# Patient Record
Sex: MALE | Race: WHITE | NOT HISPANIC OR LATINO | Employment: OTHER | URBAN - METROPOLITAN AREA
[De-identification: names, ages, dates, MRNs, and addresses within clinical notes are randomized per-mention and may not be internally consistent; named-entity substitution may affect disease eponyms.]

---

## 2023-02-23 LAB
ALANINE AMINOTRANSFERASE (SGPT) (U/L) IN SER/PLAS: 24 U/L (ref 10–52)
ALBUMIN (G/DL) IN SER/PLAS: 4.7 G/DL (ref 3.4–5)
ALKALINE PHOSPHATASE (U/L) IN SER/PLAS: 73 U/L (ref 33–136)
ANION GAP IN SER/PLAS: 12 MMOL/L (ref 10–20)
ASPARTATE AMINOTRANSFERASE (SGOT) (U/L) IN SER/PLAS: 24 U/L (ref 9–39)
BILIRUBIN TOTAL (MG/DL) IN SER/PLAS: 0.9 MG/DL (ref 0–1.2)
CALCIUM (MG/DL) IN SER/PLAS: 10.3 MG/DL (ref 8.6–10.3)
CARBON DIOXIDE, TOTAL (MMOL/L) IN SER/PLAS: 29 MMOL/L (ref 21–32)
CHLORIDE (MMOL/L) IN SER/PLAS: 103 MMOL/L (ref 98–107)
CHOLESTEROL (MG/DL) IN SER/PLAS: 103 MG/DL (ref 0–199)
CHOLESTEROL IN HDL (MG/DL) IN SER/PLAS: 34.3 MG/DL
CHOLESTEROL/HDL RATIO: 3
CREATININE (MG/DL) IN SER/PLAS: 1.38 MG/DL (ref 0.5–1.3)
ERYTHROCYTE DISTRIBUTION WIDTH (RATIO) BY AUTOMATED COUNT: 13.4 % (ref 11.5–14.5)
ERYTHROCYTE MEAN CORPUSCULAR HEMOGLOBIN CONCENTRATION (G/DL) BY AUTOMATED: 32.3 G/DL (ref 32–36)
ERYTHROCYTE MEAN CORPUSCULAR VOLUME (FL) BY AUTOMATED COUNT: 98 FL (ref 80–100)
ERYTHROCYTES (10*6/UL) IN BLOOD BY AUTOMATED COUNT: 4.6 X10E12/L (ref 4.5–5.9)
ESTIMATED AVERAGE GLUCOSE FOR HBA1C: 120 MG/DL
GFR MALE: 53 ML/MIN/1.73M2
GLUCOSE (MG/DL) IN SER/PLAS: 126 MG/DL (ref 74–99)
HEMATOCRIT (%) IN BLOOD BY AUTOMATED COUNT: 45.2 % (ref 41–52)
HEMOGLOBIN (G/DL) IN BLOOD: 14.6 G/DL (ref 13.5–17.5)
HEMOGLOBIN A1C/HEMOGLOBIN TOTAL IN BLOOD: 5.8 %
LDL: 31 MG/DL (ref 0–99)
LEUKOCYTES (10*3/UL) IN BLOOD BY AUTOMATED COUNT: 7.6 X10E9/L (ref 4.4–11.3)
MAGNESIUM (MG/DL) IN SER/PLAS: 1.69 MG/DL (ref 1.6–2.4)
NRBC (PER 100 WBCS) BY AUTOMATED COUNT: 0 /100 WBC (ref 0–0)
PLATELETS (10*3/UL) IN BLOOD AUTOMATED COUNT: 240 X10E9/L (ref 150–450)
POTASSIUM (MMOL/L) IN SER/PLAS: 4.4 MMOL/L (ref 3.5–5.3)
PROTEIN TOTAL: 7 G/DL (ref 6.4–8.2)
SODIUM (MMOL/L) IN SER/PLAS: 140 MMOL/L (ref 136–145)
THYROTROPIN (MIU/L) IN SER/PLAS BY DETECTION LIMIT <= 0.05 MIU/L: 3.15 MIU/L (ref 0.44–3.98)
TRIGLYCERIDE (MG/DL) IN SER/PLAS: 191 MG/DL (ref 0–149)
UREA NITROGEN (MG/DL) IN SER/PLAS: 24 MG/DL (ref 6–23)
VLDL: 38 MG/DL (ref 0–40)

## 2023-04-05 DIAGNOSIS — E11.69 TYPE 2 DIABETES MELLITUS WITH OTHER SPECIFIED COMPLICATION, UNSPECIFIED WHETHER LONG TERM INSULIN USE (MULTI): Primary | ICD-10-CM

## 2023-04-05 DIAGNOSIS — I10 PRIMARY HYPERTENSION: ICD-10-CM

## 2023-04-06 RX ORDER — METFORMIN HYDROCHLORIDE 1000 MG/1
TABLET ORAL
Qty: 180 TABLET | Refills: 3 | Status: SHIPPED | OUTPATIENT
Start: 2023-04-06 | End: 2024-05-10 | Stop reason: SDUPTHER

## 2023-04-06 RX ORDER — AMLODIPINE BESYLATE 5 MG/1
TABLET ORAL
Qty: 180 TABLET | Refills: 3 | Status: SHIPPED | OUTPATIENT
Start: 2023-04-06 | End: 2024-05-10 | Stop reason: SDUPTHER

## 2023-05-25 DIAGNOSIS — E11.69 TYPE 2 DIABETES MELLITUS WITH OTHER SPECIFIED COMPLICATION, WITHOUT LONG-TERM CURRENT USE OF INSULIN (MULTI): Primary | ICD-10-CM

## 2023-05-25 PROBLEM — H61.20 IMPACTED CERUMEN: Status: ACTIVE | Noted: 2023-05-25

## 2023-05-25 PROBLEM — R00.0 TACHYCARDIA: Status: ACTIVE | Noted: 2023-01-23

## 2023-05-25 PROBLEM — K85.90 ACUTE PANCREATITIS (HHS-HCC): Status: ACTIVE | Noted: 2023-01-27

## 2023-05-25 PROBLEM — J30.2 SEASONAL ALLERGIES: Status: ACTIVE | Noted: 2023-05-25

## 2023-05-25 PROBLEM — E66.01 SEVERE OBESITY (BMI 35.0-39.9) WITH COMORBIDITY (MULTI): Status: ACTIVE | Noted: 2023-01-31

## 2023-05-25 PROBLEM — K25.7 CHRONIC GASTRIC ULCER WITHOUT HEMORRHAGE AND WITHOUT PERFORATION: Status: ACTIVE | Noted: 2023-01-31

## 2023-05-25 PROBLEM — E55.9 VITAMIN D DEFICIENCY: Status: ACTIVE | Noted: 2023-05-25

## 2023-05-25 PROBLEM — E03.9 HYPOTHYROIDISM: Status: ACTIVE | Noted: 2023-01-31

## 2023-05-25 PROBLEM — U07.1 DISEASE DUE TO SEVERE ACUTE RESPIRATORY SYNDROME CORONAVIRUS 2 (SARS-COV-2): Status: ACTIVE | Noted: 2023-05-25

## 2023-05-25 PROBLEM — N18.32 STAGE 3B CHRONIC KIDNEY DISEASE (MULTI): Status: ACTIVE | Noted: 2023-01-31

## 2023-05-25 PROBLEM — I38 ENDOCARDITIS: Status: ACTIVE | Noted: 2023-05-25

## 2023-05-25 PROBLEM — I10 BENIGN ESSENTIAL HYPERTENSION: Status: ACTIVE | Noted: 2023-05-25

## 2023-05-25 PROBLEM — E87.70 LOCALIZED EDEMA DUE TO FLUID OVERLOAD: Status: ACTIVE | Noted: 2023-05-25

## 2023-05-25 PROBLEM — K81.9 CHOLECYSTITIS: Status: ACTIVE | Noted: 2023-01-27

## 2023-05-25 PROBLEM — I44.1 MOBITZ TYPE I INCOMPLETE ATRIOVENTRICULAR BLOCK: Status: ACTIVE | Noted: 2023-05-25

## 2023-05-25 PROBLEM — E78.5 HYPERLIPIDEMIA: Status: ACTIVE | Noted: 2023-01-31

## 2023-05-25 PROBLEM — I48.92 ATRIAL FLUTTER (MULTI): Status: ACTIVE | Noted: 2023-05-25

## 2023-05-25 PROBLEM — E11.9 TYPE 2 DIABETES MELLITUS (MULTI): Status: ACTIVE | Noted: 2023-01-31

## 2023-05-25 PROBLEM — K22.70 BARRETT'S ESOPHAGUS: Status: ACTIVE | Noted: 2023-05-25

## 2023-05-25 PROBLEM — E83.52 HYPERCALCEMIA: Status: ACTIVE | Noted: 2023-05-25

## 2023-05-25 PROBLEM — N28.9 DISORDER OF KIDNEY: Status: ACTIVE | Noted: 2023-05-25

## 2023-05-25 RX ORDER — OMEGA-3-ACID ETHYL ESTERS 1 G/1
1 CAPSULE, LIQUID FILLED ORAL 2 TIMES DAILY
COMMUNITY
Start: 2014-09-02

## 2023-05-25 RX ORDER — ACETAMINOPHEN 500 MG/1
500 CAPSULE, LIQUID FILLED ORAL EVERY 6 HOURS PRN
COMMUNITY
End: 2023-12-04 | Stop reason: SINTOL

## 2023-05-25 RX ORDER — LORATADINE 10 MG/1
10 TABLET ORAL DAILY
COMMUNITY
Start: 2015-04-29

## 2023-05-25 RX ORDER — GLIPIZIDE 5 MG/1
5 TABLET ORAL
COMMUNITY
Start: 2016-04-28 | End: 2023-10-14

## 2023-05-25 RX ORDER — PANTOPRAZOLE SODIUM 40 MG/1
40 TABLET, DELAYED RELEASE ORAL
COMMUNITY
End: 2024-05-09 | Stop reason: SDUPTHER

## 2023-05-25 RX ORDER — SOTALOL HYDROCHLORIDE 120 MG/1
120 TABLET ORAL 2 TIMES DAILY
COMMUNITY
Start: 2023-01-05

## 2023-05-25 RX ORDER — ASPIRIN 81 MG/1
81 TABLET ORAL
COMMUNITY

## 2023-05-25 RX ORDER — SUCRALFATE 1 G/1
1 TABLET ORAL EVERY 6 HOURS
COMMUNITY
Start: 2023-02-04 | End: 2023-06-06 | Stop reason: ALTCHOICE

## 2023-05-25 RX ORDER — GLUCOSAMINE/CHONDR SU A SOD 750-600 MG
1 TABLET ORAL 2 TIMES DAILY
COMMUNITY

## 2023-05-25 RX ORDER — MULTIVITAMIN
1 TABLET ORAL
COMMUNITY

## 2023-05-25 RX ORDER — ATORVASTATIN CALCIUM 10 MG/1
10 TABLET, FILM COATED ORAL
COMMUNITY
Start: 2016-04-28 | End: 2023-07-02 | Stop reason: SDUPTHER

## 2023-05-25 RX ORDER — ACETAMINOPHEN 500 MG
50 TABLET ORAL DAILY
COMMUNITY
Start: 2018-06-14

## 2023-05-25 RX ORDER — LEVOTHYROXINE SODIUM 50 UG/1
50 TABLET ORAL
COMMUNITY
Start: 2012-09-16 | End: 2023-10-14

## 2023-05-25 RX ORDER — LISINOPRIL 40 MG/1
40 TABLET ORAL 2 TIMES DAILY
COMMUNITY
Start: 2012-10-01 | End: 2023-10-14

## 2023-05-25 RX ORDER — HYDROCHLOROTHIAZIDE 25 MG/1
25 TABLET ORAL
COMMUNITY
End: 2024-01-15 | Stop reason: SDUPTHER

## 2023-05-25 RX ORDER — LANCETS
1 EACH MISCELLANEOUS
COMMUNITY
Start: 2023-04-05 | End: 2023-10-14

## 2023-05-26 DIAGNOSIS — E11.9 CONTROLLED TYPE 2 DIABETES MELLITUS WITHOUT COMPLICATION, WITHOUT LONG-TERM CURRENT USE OF INSULIN (MULTI): Primary | ICD-10-CM

## 2023-05-26 PROBLEM — R74.01 ELEVATION OF LEVELS OF LIVER TRANSAMINASE LEVELS: Status: ACTIVE | Noted: 2023-01-27

## 2023-05-26 PROBLEM — K29.70 GASTRITIS: Status: ACTIVE | Noted: 2023-01-27

## 2023-05-26 RX ORDER — BLOOD SUGAR DIAGNOSTIC
1 STRIP MISCELLANEOUS 2 TIMES DAILY
Qty: 180 STRIP | Refills: 1 | Status: SHIPPED | OUTPATIENT
Start: 2023-05-26 | End: 2023-06-06 | Stop reason: ALTCHOICE

## 2023-05-26 RX ORDER — BLOOD SUGAR DIAGNOSTIC
1 STRIP MISCELLANEOUS 2 TIMES DAILY
COMMUNITY
End: 2023-05-26 | Stop reason: SDUPTHER

## 2023-06-05 NOTE — PROGRESS NOTES
Subjective   Reason for Visit: Chito Baxter is an 77 y.o. male here for his Subsequent Medicare Assessment and annual physical        He has episodes where he will remain in a- fib for 2- 3 days  He does not feel lightheaded when standing     He is not exercising routinely     He has hyperkeratosis on the auricles of his ears and seeing dermatology  He uses creams and sometimes derm freezes the area   He used 5 FU cream in 2021.     HEALTH :  PSA 4/15, 4/19, 6/2020 and last one   Colonoscopy 3/12, 10/17 + adenoma, 10/2020 and Q 5  EKG 2012, 2013, 10/14 , 2015 , 12/16, 2018, 2022, 1/2023, 3/2023 cardio  Spot protein 10/13, 10/14, 10/15 , 10/16 , 12/17 , 12/18, 6/19, 6/2020, 6/2021, 6/2022, ordered 6/2023  Hep C 1/18 -  HD flu 10/17, 12/18, 9/19, 9/2020, 9/2021, 9/2022   TDAP 2006, 12/17/18, 9/28/2022  Pneumovax 2011   Prevnar 13 12/14/17  TDAP 2006 and will repeat with injury   Shingrix 5/18 and 8/18  Moderna CVD 2/5/2021 and 3/5/2021 booster 11/16/2021, 3/31/2022, 9/28/2022, 6/3/2023   Ophth He is seeing Dr Turner/ Dr Lubin yearly. No glaucoma or retinopathy. The cataracts are stable OU.  He follows with ophthalmology in December    Copy of his Advanced Directives scanned in his chart 6/2022       Patient Care Team:  Chichi Perez MD as PCP - General  Chichi Perez MD as PCP - [employer] PCP     Review of Systems  All systems negative except those listed in the HPI      Past Medical, Surgical, and Family History reviewed and updated in chart.  Reviewed all medications by prescribing practitioner or clinical pharmacist   (such as prescriptions, OTCs, herbal therapies and supplements) and documented in the medical record      Objective   Vitals:  Visit Vitals  BP 98/60 (BP Location: Left arm, Patient Position: Sitting, BP Cuff Size: Large adult)   Pulse 53   Temp 36 °C (96.8 °F) (Skin)    Body mass index is 33.91 kg/m².      Physical Exam  Vitals reviewed.   Constitutional:       Appearance:  Normal appearance. He is obese.   HENT:      Head: Normocephalic.      Right Ear: Tympanic membrane, ear canal and external ear normal.      Left Ear: Tympanic membrane, ear canal and external ear normal.      Nose: Nose normal.      Mouth/Throat:      Pharynx: Oropharynx is clear.   Eyes:      Conjunctiva/sclera: Conjunctivae normal.   Cardiovascular:      Rate and Rhythm: Normal rate and regular rhythm.      Pulses: Normal pulses.      Heart sounds: Normal heart sounds.      Comments: he has varicose veins and chronic scarring from venous insufficiency LE bilaterally, 2+ pitting edema left ankle   He is in sinus on exam   Pulmonary:      Effort: Pulmonary effort is normal.      Breath sounds: Normal breath sounds.   Abdominal:      General: Bowel sounds are normal.      Palpations: Abdomen is soft.   Musculoskeletal:         General: Normal range of motion.      Cervical back: Normal range of motion and neck supple.   Skin:     General: Skin is warm.   Neurological:      General: No focal deficit present.      Mental Status: He is alert and oriented to person, place, and time.      Comments: Diabetic foot exam is normal bilaterally    Psychiatric:         Mood and Affect: Mood normal.         Behavior: Behavior normal.         Thought Content: Thought content normal.         Judgment: Judgment normal.       Assessment/Plan   Problem List Items Addressed This Visit       Controlled type 2 diabetes mellitus without complication, without long-term current use of insulin (CMS/[facility])    Relevant Orders    CBC    Comprehensive Metabolic Panel    Lipid Panel    TSH with reflex to Free T4 if abnormal    Hemoglobin A1C    Albumin , Urine Random    Stage 3b chronic kidney disease    Overview     Comment on above: Chronic renal failure, stage 3b;         Relevant Orders    Uric Acid    PTH, intact     Other Visit Diagnoses       [facility] maintenance    -  Primary          Subsequent Medicare Assessment and annual physical  "completed   Reviewed his labs from 2/2023   Labs ordered     Medicare Wellness completed  -  Discussed healthy diet and regular exercise.    -  Physical exam overall unremarkable. Immunizations reviewed and updated accordingly. Healthy lifestyle choices discussed (tobacco avoidance, appropriate alcohol use, avoidance of illicit substances).   -  Patient is wearing seatbelt.   -  Screening lab work ordered as indicated.    -  Age appropriate screening tests reviewed with patient.        We spent 15 minutes discussing depression screen and there is nothing found that is of concern for underling depression. The PQH form was filled and the meds reviewed. No depression to report     We spent 15 minutes discussing alcohol use and there are no concerns about overuse. The 15 min was spent in going over any issues of use of alcohol.   He has 1 EtOH drink a day.     He has grab bars in the shower.  He has not fallen recently and no risk of falls in the house   He has good lighting around the house and functioning smoke detectors.     He has no issues with his hearing to report   He had his hearing tested in the past and found to have high frequency loss     His weight in office today is 223 with BMI of 33.91. We spent 15 minutes discussing diet and weight loss. The struggle of weight loss persists    He has been shopping in the \"[employer] section since childhood   He is not exercising routinely     HTN: Stable but low.   Continue lisinopril 40 mg BID, HCTZ 25 mg daily and Norvasc 5 mg BID.   Stress test in 1/11 was normal  PVD studies in 2/12 were normal.  ECG: Afib @ 67 nonspecific ST-T wave abnormalities with Dr Mckeon in 3/2023   Recommend he monitor his BP at home and call with elevated readings   He saw Dr Mckeon in 3/2023     A-Fib: He is in a- fib 2/2023. On exam: he is in sinus 6/2023. He is asymptomatic. Per cardiology he is in a- fib 60% of the time 3/2023. He has episodes where he will remain in a- fib for 2- 3 days "   Dx him with atrial fib on appt in 12/16, he sees Dr Mckeon in cardiology.   He is monitoring his EKG on the [employer].   ECHO was normal, cardioversion failed x 3.   He was admitted on 6/17/17 and did convert with the Sotalol   Continue Sotalol 120 mg BID and Eliquis 5 mg daily.  He needs to see about having an ablation but after talking with Dr Jane, his weight is too high for surgery. He was told he needs to lose 40 pounds.   Continue monitoring BP at home and call with elevated readings   He saw Dr Mckeon in 3/2023     The patient's 10 yr CV risk was estimated at % --- Unable to calculate due to LDL being so low 6/2022     HLD: LDL 31 and HDL 34 on labs in 2/2023. Labs ordered and we will adjust if indicated  6/2023. I would like to see his HDL between 55- 60   Continue atorvastatin 10 mg daily and Fish oil daily.   Ca cardiac score 5/2021 was 339 in LAD and atelectasis noted only   Discussed diet and exercise for better lipid control     Hypothyroid: TSH 3.15 on labs in 2/2023. Labs ordered and we will adjust if indicated  6/2023   Continue levothyroxine 50 mcg daily.    DM: HbA1c was 5.8 % on labs in 2/2023. Labs ordered and we will adjust if indicated  6/2023. Diabetic Composite Score 5/5 IO today 6/2023. Diabetic foot exam normal 6/2023   Continue metformin 1000 mg BID and glipizide 5 mg BID.   He exercises daily and wears support stockings when he travels.   Spot protein was normal in 6/2022 and ordered 6/2023   Recommend he monitor his feet nightly for skin breakdown and ulcerations   He is UTD on his visits with ophthalmology, he sees oph in December every year     Renal insuff: His GFR ranges from low 40's to 55. GFR 53 on labs in 2/2023  Explained I want this to remain under 2.0   We will continue to monitor    Guerrero's esophagus:  ERCP 1/30/23 which showed Esophageal mucosal changes 6 suggestive of Guerrero's esophagus were noted and biopsied. Nonbleeding gastric ulcer with no stigmata of  bleeding was biopsied, duodenal erosions without bleeding were noted. 1 partially occluded stent noted in the duodenum was removed. Choledocholithiasis was noted and successfully removed via biliary sphincterotomy and balloon extraction.  Continue Protonix 40 mg daily   He saw Dr Roberts 3/2023     Seasonal allergies:  Continue loratadine 10 mg prn use     Venous insufficiency: he has varicose veins and chronic scarring from venous insufficiency LE bilaterally, 2+ pitting edema left ankle 6/2023  Continue wearing support stockings with travel.  He will be careful when he first gets up due to venous pooling. Norvasc can contribute to this.     Admitted on 1/23/2023 and discharged on 1/27/2023 for cholecystitis, s/p cholecystectomy   CXR 1/2023 showed mild cardiomegaly, mild bibasilar atelectasis  CT A/P shows distended gallbladder with soft tissue stranding, concerning for acute cholecystitis. Also inflammation of the common bile duct to the pancreatic head/ uncinate process and to the proximal duodenum. Bilateral perinephric stranding and trace amount of free fluid nonspecific. Colonic diverticulitis. Circumferential wall thickening of the distal esophagus at the GE junction, may be due to esophagitis. Mild extrahepatic biliary dilation   Patient was given Zosyn 4.5 g IV for the cholecystitis. There is concern for blockage such as choledocholithiasis or mass. The study is limited somewhat by the lack of IV contrast due to his GFR.  -S/p lap cholecystectomy and #15 Croatian drain was placed in the Morison's pouch 1/25/2023, s/p ERCP and sphincterotomy  He saw Dr Roberts in 3/2023     Renal cyst:  The kidney cyst takes up 20% of volume.     Nocturia:  He has 1-3 episodes at night depending on fluid intake prior to bed.    Diarrhea:   He has semi-liquid soft stools and rectal leakage with exertion.   Recommend Metamucil daily.     Admitted on 1/29/2023 and discharged on 1/31/2023 for melena :   Presentation at arrival most  suggestive of duodenal injury post ERCP. Other differentials, peptic ulcer disease, gastritis, angiodysplasia were considered.   ERCP 1/30/23 which showed Esophageal mucosal changes 6 suggestive of Guerrero's esophagus were noted and biopsied. Nonbleeding gastric ulcer with no stigmata of bleeding was biopsied, duodenal erosions without bleeding were noted. 1 partially occluded stent noted in the duodenum was removed.   Choledocholithiasis was noted and successfully removed via biliary sphincterotomy and balloon extraction.  Continue Protonix 40 mg daily   He saw Dr Roberts in 2/2023 and per notes: Now s/p ERCP with biliary sphincterotomy, stone removal, and CCY. LFTs mild increase post ERCP, now all WNL.   Also had ? melena and HgB drop to 10-11, no bleeding seen on EGD. Does have mild inflammation at GEJ and duodenal erosions, on PPI. Discussed in detail prior concern for GEJ nodule, which now appears most consistent with focal inflammation. Would hold on EUS at this time, consider EGD in one year. Continue PPI. HgB 14.6.     Balance issues:  He declines balance therapy.  Recommend he try Pa [facility], encouraged him to modify his activity levels (not going up ladders, etc.    Skin issues:  He has hyperkeratosis on the auricles of his ears and seeing dermatology  He uses creams and sometimes derm freezes the area    He saw Dr Lozano and had some laser to the face 12/19  He used 5FU cream as prescribed by dermatology in 2021    Vitamin D deficiency: Vitamin D 66 on labs in 12/2021   Continue OTC Vitamin D 2000 units daily.     Uric Acid levels in 6/19 was 8.5.  We discussed diet. We will consider allopurinol next for cardiac protection.     PSA normal in 6/2020 and last one   Colonoscopy in 10/2020 and Q 5.     Ophth:  He is seeing Dr Turner/ Dr Lubin yearly. No glaucoma or retinopathy. The cataracts are stable OU. He follows with ophthalmology in December   He will have his next eye exam faxed to my office in order to  update his medical records.    I spent 15 min with the patient discussing their wishes for end of life choices.   We discussed the need for a Living Will and that wishes should be discussed with Family. The DNR status was reviewed, and we discussed the options of this and, the DNR _CC options as well.   We also went over how important it was to have these choices written down and clear for any surviving family so that their wishes are followed   The patient and I came to to following agreement :   His wife is his medical POA and then his daughters. He has a living will.   Copy of his Advanced Directives scanned in his chart 6/2022      Hep C 1/18 -  HD flu 10/17, 12/18, 9/19, 9/2020, 9/2021, 9/2022   TDAP 2006, 12/17/18, 9/28/2022  Pneumovax 2011   Prevnar 13 12/14/17  TDAP 2006 and will repeat with injury   Shingrix 5/18 and 8/18  Moderna CVD 2/5/2021 and 3/5/2021 booster 11/16/2021, 3/31/2022, 9/28/2022, 6/3/2023    RTC in 6 months for follow up or sooner if needed     Scribe Attestation  By signing my name below, I, Viky Hahn , Scribe   attest that this documentation has been prepared under the direction and in the presence of Chichi Perez MD.

## 2023-06-06 ENCOUNTER — OFFICE VISIT (OUTPATIENT)
Dept: PRIMARY CARE | Facility: CLINIC | Age: 78
End: 2023-06-06
Payer: MEDICARE

## 2023-06-06 VITALS
SYSTOLIC BLOOD PRESSURE: 98 MMHG | HEIGHT: 68 IN | OXYGEN SATURATION: 98 % | BODY MASS INDEX: 33.8 KG/M2 | DIASTOLIC BLOOD PRESSURE: 60 MMHG | TEMPERATURE: 96.8 F | HEART RATE: 53 BPM | WEIGHT: 223 LBS

## 2023-06-06 DIAGNOSIS — E03.9 HYPOTHYROIDISM, UNSPECIFIED TYPE: ICD-10-CM

## 2023-06-06 DIAGNOSIS — E66.01 SEVERE OBESITY (BMI 35.0-39.9) WITH COMORBIDITY (MULTI): ICD-10-CM

## 2023-06-06 DIAGNOSIS — K25.7 CHRONIC GASTRIC ULCER WITHOUT HEMORRHAGE AND WITHOUT PERFORATION: ICD-10-CM

## 2023-06-06 DIAGNOSIS — I10 BENIGN ESSENTIAL HYPERTENSION: ICD-10-CM

## 2023-06-06 DIAGNOSIS — I48.92 ATRIAL FLUTTER, UNSPECIFIED TYPE (MULTI): ICD-10-CM

## 2023-06-06 DIAGNOSIS — Z00.00 HEALTHCARE MAINTENANCE: Primary | ICD-10-CM

## 2023-06-06 DIAGNOSIS — I44.1 MOBITZ TYPE I INCOMPLETE ATRIOVENTRICULAR BLOCK: ICD-10-CM

## 2023-06-06 DIAGNOSIS — E11.9 CONTROLLED TYPE 2 DIABETES MELLITUS WITHOUT COMPLICATION, WITHOUT LONG-TERM CURRENT USE OF INSULIN (MULTI): ICD-10-CM

## 2023-06-06 DIAGNOSIS — N18.32 STAGE 3B CHRONIC KIDNEY DISEASE (MULTI): ICD-10-CM

## 2023-06-06 DIAGNOSIS — E78.2 MIXED HYPERLIPIDEMIA: ICD-10-CM

## 2023-06-06 PROBLEM — K29.70 GASTRITIS: Status: RESOLVED | Noted: 2023-01-27 | Resolved: 2023-06-06

## 2023-06-06 PROBLEM — K85.90 ACUTE PANCREATITIS (HHS-HCC): Status: RESOLVED | Noted: 2023-01-27 | Resolved: 2023-06-06

## 2023-06-06 PROCEDURE — 1036F TOBACCO NON-USER: CPT | Performed by: INTERNAL MEDICINE

## 2023-06-06 PROCEDURE — 3078F DIAST BP <80 MM HG: CPT | Performed by: INTERNAL MEDICINE

## 2023-06-06 PROCEDURE — 1159F MED LIST DOCD IN RCRD: CPT | Performed by: INTERNAL MEDICINE

## 2023-06-06 PROCEDURE — 99397 PER PM REEVAL EST PAT 65+ YR: CPT | Performed by: INTERNAL MEDICINE

## 2023-06-06 PROCEDURE — G0439 PPPS, SUBSEQ VISIT: HCPCS | Performed by: INTERNAL MEDICINE

## 2023-06-06 PROCEDURE — 3074F SYST BP LT 130 MM HG: CPT | Performed by: INTERNAL MEDICINE

## 2023-06-06 PROCEDURE — 1160F RVW MEDS BY RX/DR IN RCRD: CPT | Performed by: INTERNAL MEDICINE

## 2023-06-06 PROCEDURE — 1170F FXNL STATUS ASSESSED: CPT | Performed by: INTERNAL MEDICINE

## 2023-06-06 PROCEDURE — 99214 OFFICE O/P EST MOD 30 MIN: CPT | Performed by: INTERNAL MEDICINE

## 2023-06-06 ASSESSMENT — ACTIVITIES OF DAILY LIVING (ADL)
BATHING: INDEPENDENT
DRESSING: INDEPENDENT
GROCERY_SHOPPING: INDEPENDENT
TAKING_MEDICATION: INDEPENDENT
MANAGING_FINANCES: INDEPENDENT
DOING_HOUSEWORK: INDEPENDENT

## 2023-06-06 ASSESSMENT — PATIENT HEALTH QUESTIONNAIRE - PHQ9
1. LITTLE INTEREST OR PLEASURE IN DOING THINGS: NOT AT ALL
SUM OF ALL RESPONSES TO PHQ9 QUESTIONS 1 AND 2: 0
2. FEELING DOWN, DEPRESSED OR HOPELESS: NOT AT ALL

## 2023-06-06 ASSESSMENT — ENCOUNTER SYMPTOMS
DEPRESSION: 0
LOSS OF SENSATION IN FEET: 0
OCCASIONAL FEELINGS OF UNSTEADINESS: 0

## 2023-06-06 NOTE — PATIENT INSTRUCTIONS
It was a pleasure to see you today.  I would like to remind you about importance of a healthy lifestyle in order to improve your well-being and live longer. Try to engage in physical activities for at least 150 minutes per week.  Eat about 10 servings of fruits and vegetables daily. My advice is 2 servings of fruits and 8 servings of vegetables. For vegetables choose at least half of them green and at least half of them fresh.  Please avoid sugar, salt, fried food and saturated fat.  Weight loss is advised. Target BMI: below 25. Please follow low carbohydrate diet and daily exercise routine for at least 30 minutes. Nutritional consultation is available, please let me know if you are interested. I will be happy to discuss details with you if interested.   Have a good day and stay well.

## 2023-07-02 DIAGNOSIS — E78.2 MIXED HYPERLIPIDEMIA: Primary | ICD-10-CM

## 2023-07-02 RX ORDER — ATORVASTATIN CALCIUM 10 MG/1
10 TABLET, FILM COATED ORAL
Qty: 90 TABLET | Refills: 3 | Status: SHIPPED | OUTPATIENT
Start: 2023-07-02 | End: 2023-07-03 | Stop reason: SDUPTHER

## 2023-07-03 DIAGNOSIS — E78.2 MIXED HYPERLIPIDEMIA: ICD-10-CM

## 2023-07-03 RX ORDER — ATORVASTATIN CALCIUM 10 MG/1
10 TABLET, FILM COATED ORAL
Qty: 90 TABLET | Refills: 3 | Status: SHIPPED | OUTPATIENT
Start: 2023-07-03 | End: 2024-05-10 | Stop reason: SDUPTHER

## 2023-10-14 DIAGNOSIS — N18.32 STAGE 3B CHRONIC KIDNEY DISEASE (MULTI): ICD-10-CM

## 2023-10-14 DIAGNOSIS — E11.9 CONTROLLED TYPE 2 DIABETES MELLITUS WITHOUT COMPLICATION, WITHOUT LONG-TERM CURRENT USE OF INSULIN (MULTI): Primary | ICD-10-CM

## 2023-10-14 DIAGNOSIS — E03.9 HYPOTHYROIDISM, UNSPECIFIED TYPE: ICD-10-CM

## 2023-10-14 RX ORDER — LEVOTHYROXINE SODIUM 50 UG/1
50 TABLET ORAL DAILY
Qty: 90 TABLET | Refills: 3 | Status: SHIPPED | OUTPATIENT
Start: 2023-10-14 | End: 2024-05-10 | Stop reason: SDUPTHER

## 2023-10-14 RX ORDER — LANCETS
EACH MISCELLANEOUS
Qty: 200 EACH | Refills: 3 | Status: SHIPPED | OUTPATIENT
Start: 2023-10-14 | End: 2024-05-11 | Stop reason: SDUPTHER

## 2023-10-14 RX ORDER — GLIPIZIDE 5 MG/1
5 TABLET ORAL 2 TIMES DAILY
Qty: 180 TABLET | Refills: 3 | Status: SHIPPED | OUTPATIENT
Start: 2023-10-14 | End: 2024-05-10 | Stop reason: SDUPTHER

## 2023-10-14 RX ORDER — LISINOPRIL 40 MG/1
40 TABLET ORAL 2 TIMES DAILY
Qty: 180 TABLET | Refills: 3 | Status: SHIPPED | OUTPATIENT
Start: 2023-10-14 | End: 2024-05-10 | Stop reason: SDUPTHER

## 2023-12-03 NOTE — PROGRESS NOTES
Subjective   Patient ID: Chito Baxter is a 78 y.o. male who presents for his 6 month follow up multiple medical conditions       He had elevated lft in blood work, had repeated labs 2 more times.  He had an upper quadrant ultrasound done and he is seeing Dr. Roberts.   He has an appt scheduled with Dr Roberts today 12/4/2023  He admits he was taking Tylenol BID but he has since stopped and he stopped Lipitor also    He is still having intermittent a- fib.    He is having cataract evaluation in 2 weeks with Dr Mathews     He has occasional rhinorrhea, PND and cough        HEALTH :  PSA 4/15, 4/19, 6/2020 and last one   Colonoscopy 3/12, 10/17 + adenoma, 10/2020 and Q 5  EKG 2012, 2013, 10/14 , 2015 , 12/16, 2018, 2022, 1/2023, 3/2023, 11/2023 cardio  Spot protein 10/15 , 10/16 , 12/17 , 12/18, 6/19, 6/2020, 6/2021, 6/2022, elevated 11/2023  Hep A 8/1/2023  Hep C 1/18 -  HD flu 10/17, 12/18, 9/19, 9/2020, 9/2021, 9/2022, 10/2023   TDAP 2006, 12/17/18, 9/28/2022  Pneumovax 2011   Prevnar 13 12/14/17  Shingrix 5/18 and 8/18  Moderna CVD 2/5/2021 and 3/5/2021 booster 11/16/2021, 3/31/2022, 9/28/2022, 6/3/2023, 9/23/2023  Ophth He saw Dr Mathews in 10/2023. yearly. He has borderline glaucoma bilaterally with narrow angles, cataracts bilaterally that are now visually significant and PVD bilat that is stable.   Copy of his Advanced Directives scanned in his chart 6/2022        Review of Systems  All systems negative except those listed in the HPI      Objective   Visit Vitals  /60 (BP Location: Left arm, Patient Position: Sitting)   Pulse 57   Temp 35.9 °C (96.6 °F) (Skin)    Body mass index is 34.82 kg/m².      Physical Exam  Vitals reviewed.   Constitutional:       Appearance: Normal appearance. He is obese.   HENT:      Head: Normocephalic.      Right Ear: Tympanic membrane, ear canal and external ear normal.      Left Ear: Tympanic membrane, ear canal and external ear normal.      Nose: Nose normal.       Mouth/Throat:      Pharynx: Oropharynx is clear.   Eyes:      Conjunctiva/sclera: Conjunctivae normal.   Cardiovascular:      Rate and Rhythm: Normal rate and regular rhythm.      Pulses: Normal pulses.      Heart sounds: Normal heart sounds.      Comments: He is in a- fib, good rate control   Pulmonary:      Effort: Pulmonary effort is normal.      Breath sounds: Normal breath sounds.   Abdominal:      General: Bowel sounds are normal.      Palpations: Abdomen is soft.   Musculoskeletal:         General: Normal range of motion.      Cervical back: Normal range of motion and neck supple.   Skin:     General: Skin is warm.   Neurological:      General: No focal deficit present.      Mental Status: He is alert and oriented to person, place, and time.   Psychiatric:         Mood and Affect: Mood normal.         Behavior: Behavior normal.         Thought Content: Thought content normal.         Judgment: Judgment normal.       Assessment/Plan   Problem List Items Addressed This Visit       Atrial flutter (CMS/HCC)    Gurerero's esophagus    Benign essential hypertension    Controlled type 2 diabetes mellitus without complication, without long-term current use of insulin (CMS/HCC)    Relevant Orders    Hemoglobin A1C    Albumin , Urine Random    Elevation of levels of liver transaminase levels - Primary    Relevant Orders    Comprehensive Metabolic Panel    Hyperlipidemia    Relevant Orders    CBC    Lipid Panel    Hypothyroidism    Relevant Orders    TSH with reflex to Free T4 if abnormal    Severe obesity (BMI 35.0-39.9) with comorbidity (CMS/HCC)    Stage 3b chronic kidney disease (CMS/HCC)    Relevant Orders    Albumin , Urine Random     Other Visit Diagnoses       Elevated uric acid in blood        Relevant Medications    allopurinol (Zyloprim) 100 mg tablet            Follow up completed  Reviewed his labs from 11/2023      He has no issues with his hearing to report   He had his hearing tested in the past and found  "to have high frequency loss      His weight in office today is 229 with BMI of 34.82. We spent 15 minutes discussing diet and weight loss. The struggle of weight loss persists    He has been shopping in the \"[employer]\" section since childhood   He is not exercising routinely      HTN: Stable.   Continue lisinopril 40 mg BID, HCTZ 25 mg daily and Norvasc 5 mg BID.   Stress test in 1/11 was normal  PVD studies in 2/12 were normal.  ECG: Afib @ 67 nonspecific ST-T wave abnormalities in 11/2023  Recommend he monitor his BP at home and call with elevated readings   He saw Dr Mckeon in 11/2023      A-Fib: He is still having intermittent a- fib. He is in a- fib, good rate control on exam 12/2023  Dx him with atrial fib on appt in 12/16. S/p unsuccessful DCCV 1-6-17.    ECHO was normal, cardioversion failed x 3.   Continue Sotalol 120 mg BID and Eliquis 5 mg daily.  He is monitoring his EKG on the [employer].    Continue monitoring BP at home and call with elevated readings   He saw Dr Mckeon in 11/2023 and per notes: Continue sotalol for now--once he has persistent Afib, stop sotalol and start pure beta blocker.      I have spent 15 min face to face with this patient discussing their cardiac risk and behavioral therapies of nutrition choices and exercise. We are trying to eliminate habits that are contributing to their cardiac risk.  We agreed on a plan of how they can reduce their current CV risk   ACO score 6/6 IO 12/2023     HLD: LDL 44 and HDL 33 on labs in 11/2023. He stopped Lipitor due to elevated LFTs. Recommend he discuss Repatha with Dr Mckeon since he had elevated LFTs on Lipitor   I would like to see his HDL between 55- 60   He stopped Lipitor due to liver functions on labs in 11/2023.  Ca cardiac score 5/2021 was 339 in LAD and atelectasis noted only   Discussed diet and exercise for better lipid control      Hypothyroid: TSH 3.17 on labs in 11/2023  Continue levothyroxine 50 mcg daily.     DM: HbA1c was 6.0 % on " labs in 11/2023.   Diabetic Composite Score 5/5 IO 12/2023.    Continue metformin 1000 mg BID and glipizide 5 mg BID.   He exercises daily and wears support stockings when he travels.   Spot protein was elevated on labs in 11/2023  Recommend he monitor his feet nightly for skin breakdown and ulcerations      Renal insuff: Ast 499 alt 335, ast 153, alt 255 11/28/2023, and 12/2/2023 ast 26, alt 22. GFR is 53. He admits he was taking Tylenol BID but he has since stopped and he stopped Lipitor also   RUQ US 11/2023 was normal   We will continue to monitor  He has an appt scheduled with Dr Roberts today 12/4/2023     Guerrero's esophagus:  ERCP 1/30/23 which showed Esophageal mucosal changes 6 suggestive of Guerrero's esophagus were noted and biopsied. Nonbleeding gastric ulcer with no stigmata of bleeding was biopsied, duodenal erosions without bleeding were noted. 1 partially occluded stent noted in the duodenum was removed. Choledocholithiasis was noted and successfully removed via biliary sphincterotomy and balloon extraction.  Continue Protonix 40 mg daily   He saw Dr Roberts 3/2023. He has an appt scheduled with Dr Roberts today 12/4/2023     Seasonal allergies:  Continue loratadine 10 mg prn use      -S/p lap cholecystectomy and #15 Slovenian drain was placed in the Morison's pouch 1/25/2023, s/p ERCP and sphincterotomy  He saw Dr Roberts in 3/2023      Renal cyst:  The kidney cyst takes up 20% of volume.      Nocturia:  He has 1-3 episodes at night depending on fluid intake prior to bed.     Diarrhea:   He has semi-liquid soft stools and rectal leakage with exertion.   Recommend Metamucil daily.      Melena :   Admitted on 1/29/2023 and discharged on 1/31/2023   ERCP 1/30/23 which showed Esophageal mucosal changes 6 suggestive of Guerrero's esophagus were noted and biopsied. Nonbleeding gastric ulcer with no stigmata of bleeding was biopsied, duodenal erosions without bleeding were noted. 1 partially occluded stent noted  in the duodenum was removed.   Choledocholithiasis was noted and successfully removed via biliary sphincterotomy and balloon extraction.  Continue Protonix 40 mg daily   He saw Dr Roberts in 2/2023 and has an appt with her again 12/4/2023     Balance issues:  He declines balance therapy.  Recommend he try [facility], encouraged him to modify his activity levels (not going up ladders, etc.    Venous insufficiency: he has varicose veins and chronic scarring from venous insufficiency LE bilaterally.  Continue wearing support stockings with travel.  He will be careful when he first gets up due to venous pooling.   Norvasc can contribute to this.      Skin issues:  He has hyperkeratosis on the auricles of his ears and seeing dermatology  He uses creams and sometimes derm freezes the area    He saw Dr Lozano and had some laser to the face 12/19  He used 5FU cream as prescribed by dermatology in 2021     Vitamin D deficiency: Vitamin D 66 on labs in 12/2021   Continue OTC Vitamin D 2000 units daily.      Uric Acid levels in 11/2023 was 9.5.  We discussed diet. Recommend he begin allopurinol.    Prescription sent in for allopurinol 100 mg daily, possible side effects discussed with medication      PSA normal in 6/2020 and last one   Colonoscopy in 10/2020 and Q 5.      Ophth: He is having cataract evaluation in 2 weeks with Dr Mathews    He saw Dr Mathews in 10/2023. yearly. He has borderline glaucoma bilaterally with narrow angles, cataracts bilaterally that are now visually significant and PVD bilat that is stable.      His wife is his medical POA and then his daughters. He has a living will.   Copy of his Advanced Directives scanned in his chart 6/2022     Hep A 8/1/2023  Hep C 1/18 -  HD flu 10/17, 12/18, 9/19, 9/2020, 9/2021, 9/2022, 2023  TDAP 2006, 12/17/18, 9/28/2022  Pneumovax 2011   Prevnar 13 12/14/17  Shingrix 5/18 and 8/18  Moderna CVD 2/5/2021 and 3/5/2021 booster 11/16/2021, 3/31/2022, 9/28/2022, 6/3/2023,  9/23/2023    Some elements in the chart were copied from Dr. Perez's last office visit with patient. Notes have been updated where appropriate, and reflect my current medical decision making from today.      RTC in 6 months for MCR or sooner if needed   (MCR due 6/2024)     Scribe Attestation  By signing my name below, I, Viky Jovani , Scribe   attest that this documentation has been prepared under the direction and in the presence of Chichi Perez MD.

## 2023-12-04 ENCOUNTER — OFFICE VISIT (OUTPATIENT)
Dept: GASTROENTEROLOGY | Facility: CLINIC | Age: 78
End: 2023-12-04
Payer: MEDICARE

## 2023-12-04 ENCOUNTER — OFFICE VISIT (OUTPATIENT)
Dept: PRIMARY CARE | Facility: CLINIC | Age: 78
End: 2023-12-04
Payer: MEDICARE

## 2023-12-04 VITALS
HEART RATE: 71 BPM | BODY MASS INDEX: 34.71 KG/M2 | SYSTOLIC BLOOD PRESSURE: 123 MMHG | HEIGHT: 68 IN | TEMPERATURE: 97.1 F | DIASTOLIC BLOOD PRESSURE: 69 MMHG | WEIGHT: 229 LBS

## 2023-12-04 VITALS
SYSTOLIC BLOOD PRESSURE: 110 MMHG | HEART RATE: 57 BPM | TEMPERATURE: 96.6 F | OXYGEN SATURATION: 99 % | BODY MASS INDEX: 34.82 KG/M2 | WEIGHT: 229 LBS | DIASTOLIC BLOOD PRESSURE: 60 MMHG

## 2023-12-04 DIAGNOSIS — I10 BENIGN ESSENTIAL HYPERTENSION: ICD-10-CM

## 2023-12-04 DIAGNOSIS — E66.01 SEVERE OBESITY (BMI 35.0-39.9) WITH COMORBIDITY (MULTI): ICD-10-CM

## 2023-12-04 DIAGNOSIS — K22.70 BARRETT'S ESOPHAGUS WITHOUT DYSPLASIA: Primary | ICD-10-CM

## 2023-12-04 DIAGNOSIS — E11.9 CONTROLLED TYPE 2 DIABETES MELLITUS WITHOUT COMPLICATION, WITHOUT LONG-TERM CURRENT USE OF INSULIN (MULTI): ICD-10-CM

## 2023-12-04 DIAGNOSIS — R74.01 ELEVATION OF LEVELS OF LIVER TRANSAMINASE LEVELS: ICD-10-CM

## 2023-12-04 DIAGNOSIS — K22.70 BARRETT'S ESOPHAGUS WITHOUT DYSPLASIA: ICD-10-CM

## 2023-12-04 DIAGNOSIS — E79.0 ELEVATED URIC ACID IN BLOOD: ICD-10-CM

## 2023-12-04 DIAGNOSIS — R74.01 ELEVATION OF LEVELS OF LIVER TRANSAMINASE LEVELS: Primary | ICD-10-CM

## 2023-12-04 DIAGNOSIS — E78.2 MIXED HYPERLIPIDEMIA: ICD-10-CM

## 2023-12-04 DIAGNOSIS — N18.32 STAGE 3B CHRONIC KIDNEY DISEASE (MULTI): ICD-10-CM

## 2023-12-04 DIAGNOSIS — I48.3 TYPICAL ATRIAL FLUTTER (MULTI): ICD-10-CM

## 2023-12-04 DIAGNOSIS — E03.9 HYPOTHYROIDISM, UNSPECIFIED TYPE: ICD-10-CM

## 2023-12-04 PROBLEM — U07.1 DISEASE DUE TO SEVERE ACUTE RESPIRATORY SYNDROME CORONAVIRUS 2 (SARS-COV-2): Status: RESOLVED | Noted: 2023-05-25 | Resolved: 2023-12-04

## 2023-12-04 PROCEDURE — 3074F SYST BP LT 130 MM HG: CPT | Performed by: INTERNAL MEDICINE

## 2023-12-04 PROCEDURE — 99215 OFFICE O/P EST HI 40 MIN: CPT | Performed by: INTERNAL MEDICINE

## 2023-12-04 PROCEDURE — 1126F AMNT PAIN NOTED NONE PRSNT: CPT | Performed by: INTERNAL MEDICINE

## 2023-12-04 PROCEDURE — 3078F DIAST BP <80 MM HG: CPT | Performed by: INTERNAL MEDICINE

## 2023-12-04 PROCEDURE — 1160F RVW MEDS BY RX/DR IN RCRD: CPT | Performed by: INTERNAL MEDICINE

## 2023-12-04 PROCEDURE — 1159F MED LIST DOCD IN RCRD: CPT | Performed by: INTERNAL MEDICINE

## 2023-12-04 PROCEDURE — 99212 OFFICE O/P EST SF 10 MIN: CPT | Performed by: INTERNAL MEDICINE

## 2023-12-04 PROCEDURE — 1157F ADVNC CARE PLAN IN RCRD: CPT | Performed by: INTERNAL MEDICINE

## 2023-12-04 PROCEDURE — 1036F TOBACCO NON-USER: CPT | Performed by: INTERNAL MEDICINE

## 2023-12-04 RX ORDER — ALLOPURINOL 100 MG/1
100 TABLET ORAL DAILY
Qty: 90 TABLET | Refills: 3 | Status: SHIPPED | OUTPATIENT
Start: 2023-12-04 | End: 2024-05-10 | Stop reason: SDUPTHER

## 2023-12-04 ASSESSMENT — PAIN SCALES - GENERAL: PAINLEVEL: 0-NO PAIN

## 2024-01-07 RX ORDER — SODIUM CHLORIDE, SODIUM LACTATE, POTASSIUM CHLORIDE, CALCIUM CHLORIDE 600; 310; 30; 20 MG/100ML; MG/100ML; MG/100ML; MG/100ML
20 INJECTION, SOLUTION INTRAVENOUS CONTINUOUS
Status: CANCELLED | OUTPATIENT
Start: 2024-01-07

## 2024-01-08 ENCOUNTER — ANESTHESIA (OUTPATIENT)
Dept: GASTROENTEROLOGY | Facility: HOSPITAL | Age: 79
End: 2024-01-08
Payer: MEDICARE

## 2024-01-08 ENCOUNTER — ANESTHESIA EVENT (OUTPATIENT)
Dept: GASTROENTEROLOGY | Facility: HOSPITAL | Age: 79
End: 2024-01-08
Payer: MEDICARE

## 2024-01-08 ENCOUNTER — HOSPITAL ENCOUNTER (OUTPATIENT)
Dept: GASTROENTEROLOGY | Facility: HOSPITAL | Age: 79
Setting detail: OUTPATIENT SURGERY
Discharge: HOME | End: 2024-01-08
Payer: MEDICARE

## 2024-01-08 ENCOUNTER — HOSPITAL ENCOUNTER (OUTPATIENT)
Dept: RADIOLOGY | Facility: HOSPITAL | Age: 79
Setting detail: OUTPATIENT SURGERY
Discharge: HOME | End: 2024-01-08
Payer: MEDICARE

## 2024-01-08 VITALS
OXYGEN SATURATION: 100 % | WEIGHT: 220 LBS | BODY MASS INDEX: 33.34 KG/M2 | TEMPERATURE: 97.2 F | HEIGHT: 68 IN | HEART RATE: 62 BPM | SYSTOLIC BLOOD PRESSURE: 109 MMHG | DIASTOLIC BLOOD PRESSURE: 60 MMHG | RESPIRATION RATE: 16 BRPM

## 2024-01-08 DIAGNOSIS — Z87.19: ICD-10-CM

## 2024-01-08 DIAGNOSIS — R79.89 ELEVATED LFTS: ICD-10-CM

## 2024-01-08 DIAGNOSIS — K22.70 BARRETT'S ESOPHAGUS WITHOUT DYSPLASIA: Primary | ICD-10-CM

## 2024-01-08 DIAGNOSIS — K25.7 CHRONIC GASTRIC ULCER WITHOUT HEMORRHAGE AND WITHOUT PERFORATION: ICD-10-CM

## 2024-01-08 LAB — GLUCOSE BLD MANUAL STRIP-MCNC: 97 MG/DL (ref 74–99)

## 2024-01-08 PROCEDURE — 43262 ENDO CHOLANGIOPANCREATOGRAPH: CPT | Performed by: INTERNAL MEDICINE

## 2024-01-08 PROCEDURE — 43239 EGD BIOPSY SINGLE/MULTIPLE: CPT | Performed by: INTERNAL MEDICINE

## 2024-01-08 PROCEDURE — 76000 FLUOROSCOPY <1 HR PHYS/QHP: CPT

## 2024-01-08 PROCEDURE — 2500000005 HC RX 250 GENERAL PHARMACY W/O HCPCS: Performed by: ANESTHESIOLOGIST ASSISTANT

## 2024-01-08 PROCEDURE — 7100000009 HC PHASE TWO TIME - INITIAL BASE CHARGE: Performed by: INTERNAL MEDICINE

## 2024-01-08 PROCEDURE — 3700000002 HC GENERAL ANESTHESIA TIME - EACH INCREMENTAL 1 MINUTE: Performed by: INTERNAL MEDICINE

## 2024-01-08 PROCEDURE — 0753T DGTZ GLS MCRSCP SLD LEVEL IV: CPT | Mod: TC,SUR,STJLAB | Performed by: INTERNAL MEDICINE

## 2024-01-08 PROCEDURE — A43264 PR ERCP REMOVE CALCULI/DEBRIS BILIARY/PANCREAS DUCT: Performed by: ANESTHESIOLOGY

## 2024-01-08 PROCEDURE — 7100000001 HC RECOVERY ROOM TIME - INITIAL BASE CHARGE: Performed by: INTERNAL MEDICINE

## 2024-01-08 PROCEDURE — 2780000003 HC OR 278 NO HCPCS: Performed by: INTERNAL MEDICINE

## 2024-01-08 PROCEDURE — 3700000001 HC GENERAL ANESTHESIA TIME - INITIAL BASE CHARGE: Performed by: INTERNAL MEDICINE

## 2024-01-08 PROCEDURE — 99100 ANES PT EXTEME AGE<1 YR&>70: CPT | Performed by: ANESTHESIOLOGY

## 2024-01-08 PROCEDURE — 7100000002 HC RECOVERY ROOM TIME - EACH INCREMENTAL 1 MINUTE: Performed by: INTERNAL MEDICINE

## 2024-01-08 PROCEDURE — A43264 PR ERCP REMOVE CALCULI/DEBRIS BILIARY/PANCREAS DUCT: Performed by: ANESTHESIOLOGIST ASSISTANT

## 2024-01-08 PROCEDURE — 43264 ERCP REMOVE DUCT CALCULI: CPT | Performed by: INTERNAL MEDICINE

## 2024-01-08 PROCEDURE — 88305 TISSUE EXAM BY PATHOLOGIST: CPT | Performed by: PATHOLOGY

## 2024-01-08 PROCEDURE — 2500000004 HC RX 250 GENERAL PHARMACY W/ HCPCS (ALT 636 FOR OP/ED): Performed by: ANESTHESIOLOGIST ASSISTANT

## 2024-01-08 PROCEDURE — 2720000007 HC OR 272 NO HCPCS: Performed by: INTERNAL MEDICINE

## 2024-01-08 PROCEDURE — 82947 ASSAY GLUCOSE BLOOD QUANT: CPT

## 2024-01-08 PROCEDURE — 7100000010 HC PHASE TWO TIME - EACH INCREMENTAL 1 MINUTE: Performed by: INTERNAL MEDICINE

## 2024-01-08 RX ORDER — PANTOPRAZOLE SODIUM 40 MG/1
40 TABLET, DELAYED RELEASE ORAL
Qty: 30 TABLET | Refills: 11 | Status: SHIPPED | OUTPATIENT
Start: 2024-01-08 | End: 2024-05-09 | Stop reason: SDUPTHER

## 2024-01-08 RX ORDER — PROPOFOL 10 MG/ML
INJECTION, EMULSION INTRAVENOUS AS NEEDED
Status: DISCONTINUED | OUTPATIENT
Start: 2024-01-08 | End: 2024-01-08

## 2024-01-08 RX ORDER — SODIUM CHLORIDE, SODIUM LACTATE, POTASSIUM CHLORIDE, CALCIUM CHLORIDE 600; 310; 30; 20 MG/100ML; MG/100ML; MG/100ML; MG/100ML
INJECTION, SOLUTION INTRAVENOUS CONTINUOUS PRN
Status: DISCONTINUED | OUTPATIENT
Start: 2024-01-08 | End: 2024-01-08

## 2024-01-08 RX ORDER — HYDRALAZINE HYDROCHLORIDE 20 MG/ML
5 INJECTION INTRAMUSCULAR; INTRAVENOUS EVERY 30 MIN PRN
Status: DISCONTINUED | OUTPATIENT
Start: 2024-01-08 | End: 2024-01-09 | Stop reason: HOSPADM

## 2024-01-08 RX ORDER — ACETAMINOPHEN 325 MG/1
975 TABLET ORAL ONCE
Status: DISCONTINUED | OUTPATIENT
Start: 2024-01-08 | End: 2024-01-09 | Stop reason: HOSPADM

## 2024-01-08 RX ORDER — ROCURONIUM BROMIDE 50 MG/5 ML
SYRINGE (ML) INTRAVENOUS AS NEEDED
Status: DISCONTINUED | OUTPATIENT
Start: 2024-01-08 | End: 2024-01-08

## 2024-01-08 RX ORDER — FENTANYL CITRATE 50 UG/ML
INJECTION, SOLUTION INTRAMUSCULAR; INTRAVENOUS AS NEEDED
Status: DISCONTINUED | OUTPATIENT
Start: 2024-01-08 | End: 2024-01-08

## 2024-01-08 RX ORDER — SODIUM CHLORIDE, SODIUM LACTATE, POTASSIUM CHLORIDE, CALCIUM CHLORIDE 600; 310; 30; 20 MG/100ML; MG/100ML; MG/100ML; MG/100ML
100 INJECTION, SOLUTION INTRAVENOUS CONTINUOUS
Status: DISCONTINUED | OUTPATIENT
Start: 2024-01-08 | End: 2024-01-09 | Stop reason: HOSPADM

## 2024-01-08 RX ORDER — ACETAMINOPHEN 325 MG/1
650 TABLET ORAL EVERY 4 HOURS PRN
Status: DISCONTINUED | OUTPATIENT
Start: 2024-01-08 | End: 2024-01-09 | Stop reason: HOSPADM

## 2024-01-08 RX ORDER — DEXAMETHASONE SODIUM PHOSPHATE 4 MG/ML
INJECTION, SOLUTION INTRA-ARTICULAR; INTRALESIONAL; INTRAMUSCULAR; INTRAVENOUS; SOFT TISSUE AS NEEDED
Status: DISCONTINUED | OUTPATIENT
Start: 2024-01-08 | End: 2024-01-08

## 2024-01-08 RX ORDER — GLYCOPYRROLATE 0.2 MG/ML
INJECTION INTRAMUSCULAR; INTRAVENOUS AS NEEDED
Status: DISCONTINUED | OUTPATIENT
Start: 2024-01-08 | End: 2024-01-08

## 2024-01-08 RX ORDER — HYDROCODONE BITARTRATE AND ACETAMINOPHEN 5; 325 MG/1; MG/1
1 TABLET ORAL EVERY 4 HOURS PRN
Status: DISCONTINUED | OUTPATIENT
Start: 2024-01-08 | End: 2024-01-09 | Stop reason: HOSPADM

## 2024-01-08 RX ORDER — ONDANSETRON HYDROCHLORIDE 2 MG/ML
INJECTION, SOLUTION INTRAVENOUS AS NEEDED
Status: DISCONTINUED | OUTPATIENT
Start: 2024-01-08 | End: 2024-01-08

## 2024-01-08 RX ORDER — OXYCODONE HYDROCHLORIDE 10 MG/1
10 TABLET ORAL EVERY 4 HOURS PRN
Status: DISCONTINUED | OUTPATIENT
Start: 2024-01-08 | End: 2024-01-09 | Stop reason: HOSPADM

## 2024-01-08 RX ORDER — ONDANSETRON HYDROCHLORIDE 2 MG/ML
4 INJECTION, SOLUTION INTRAVENOUS ONCE AS NEEDED
Status: DISCONTINUED | OUTPATIENT
Start: 2024-01-08 | End: 2024-01-09 | Stop reason: HOSPADM

## 2024-01-08 RX ORDER — PHENYLEPHRINE HCL IN 0.9% NACL 1 MG/10 ML
SYRINGE (ML) INTRAVENOUS AS NEEDED
Status: DISCONTINUED | OUTPATIENT
Start: 2024-01-08 | End: 2024-01-08

## 2024-01-08 RX ORDER — SODIUM CHLORIDE, SODIUM LACTATE, POTASSIUM CHLORIDE, CALCIUM CHLORIDE 600; 310; 30; 20 MG/100ML; MG/100ML; MG/100ML; MG/100ML
20 INJECTION, SOLUTION INTRAVENOUS CONTINUOUS
Status: DISCONTINUED | OUTPATIENT
Start: 2024-01-08 | End: 2024-01-09 | Stop reason: HOSPADM

## 2024-01-08 RX ORDER — LIDOCAINE HYDROCHLORIDE 20 MG/ML
INJECTION, SOLUTION EPIDURAL; INFILTRATION; INTRACAUDAL; PERINEURAL AS NEEDED
Status: DISCONTINUED | OUTPATIENT
Start: 2024-01-08 | End: 2024-01-08

## 2024-01-08 RX ORDER — ALBUTEROL SULFATE 0.83 MG/ML
2.5 SOLUTION RESPIRATORY (INHALATION) ONCE AS NEEDED
Status: DISCONTINUED | OUTPATIENT
Start: 2024-01-08 | End: 2024-01-09 | Stop reason: HOSPADM

## 2024-01-08 RX ADMIN — SODIUM CHLORIDE, POTASSIUM CHLORIDE, SODIUM LACTATE AND CALCIUM CHLORIDE: 600; 310; 30; 20 INJECTION, SOLUTION INTRAVENOUS at 07:25

## 2024-01-08 RX ADMIN — DEXAMETHASONE SODIUM PHOSPHATE 4 MG: 4 INJECTION, SOLUTION INTRAMUSCULAR; INTRAVENOUS at 08:35

## 2024-01-08 RX ADMIN — FENTANYL CITRATE 50 MCG: 50 INJECTION, SOLUTION INTRAMUSCULAR; INTRAVENOUS at 08:23

## 2024-01-08 RX ADMIN — Medication 200 MCG: at 08:36

## 2024-01-08 RX ADMIN — Medication 300 MCG: at 08:47

## 2024-01-08 RX ADMIN — LIDOCAINE HYDROCHLORIDE 60 MG: 20 INJECTION, SOLUTION EPIDURAL; INFILTRATION; INTRACAUDAL; PERINEURAL at 08:23

## 2024-01-08 RX ADMIN — ONDANSETRON 4 MG: 2 INJECTION, SOLUTION INTRAMUSCULAR; INTRAVENOUS at 08:35

## 2024-01-08 RX ADMIN — Medication 50 MG: at 08:23

## 2024-01-08 RX ADMIN — GLYCOPYRROLATE 0.2 MG: 0.2 INJECTION, SOLUTION INTRAMUSCULAR; INTRAVENOUS at 08:45

## 2024-01-08 RX ADMIN — SUGAMMADEX 200 MG: 100 INJECTION, SOLUTION INTRAVENOUS at 09:00

## 2024-01-08 RX ADMIN — PROPOFOL 150 MG: 10 INJECTION, EMULSION INTRAVENOUS at 08:23

## 2024-01-08 RX ADMIN — Medication 200 MCG: at 08:43

## 2024-01-08 SDOH — HEALTH STABILITY: MENTAL HEALTH: CURRENT SMOKER: 0

## 2024-01-08 ASSESSMENT — PAIN - FUNCTIONAL ASSESSMENT
PAIN_FUNCTIONAL_ASSESSMENT: 0-10

## 2024-01-08 ASSESSMENT — COLUMBIA-SUICIDE SEVERITY RATING SCALE - C-SSRS
2. HAVE YOU ACTUALLY HAD ANY THOUGHTS OF KILLING YOURSELF?: NO
6. HAVE YOU EVER DONE ANYTHING, STARTED TO DO ANYTHING, OR PREPARED TO DO ANYTHING TO END YOUR LIFE?: NO
1. IN THE PAST MONTH, HAVE YOU WISHED YOU WERE DEAD OR WISHED YOU COULD GO TO SLEEP AND NOT WAKE UP?: NO

## 2024-01-08 ASSESSMENT — PAIN SCALES - GENERAL
PAINLEVEL_OUTOF10: 0 - NO PAIN

## 2024-01-08 NOTE — ANESTHESIA PREPROCEDURE EVALUATION
Patient: Chito Baxter    Procedure Information       Date/Time: 01/08/24 0800    Scheduled providers: Alfreda Roberts MD    Procedures:       EGD      ERCP    [facility]; [facility]            Relevant Problems   Cardiovascular   (+) Atrial flutter (CMS/HCC)   (+) Benign essential hypertension   (+) Hyperlipidemia   (+) Mobitz type I incomplete atrioventricular block   (+) Pure hypercholesterolemia      Endocrine   (+) Controlled type 2 diabetes mellitus without complication, without long-term current use of insulin (CMS/[facility])   (+) Hypothyroidism   (+) Severe obesity (BMI 35.0-39.9) with comorbidity (CMS/HCC)   (+) Type 2 diabetes mellitus (CMS/[facility])      GI   (+) Chronic gastric ulcer without hemorrhage and without perforation      /Renal   (+) Disorder of kidney   (+) Stage 3b chronic kidney disease (CMS/[facility])       Clinical information reviewed:   Tobacco  Allergies  Meds   Med Hx  Surg Hx   Fam Hx  Soc Hx        NPO Detail:  NPO/Void Status  Carbohydrate Drink Given Prior to Surgery? : N  Date of Last Liquid: 01/08/24  Time of Last Liquid: 0500  Date of Last Solid: 01/07/24  Time of Last Solid: 2000  Last Intake Type: Clear fluids  Time of Last Void: 0500         Physical Exam    Airway  Mallampati: II  TM distance: >3 FB  Neck ROM: full     Cardiovascular - normal exam  Rhythm: regular  Rate: normal     Dental - normal exam     Pulmonary - normal exam  Breath sounds clear to auscultation     Abdominal   (+) obese  Abdomen: soft  Bowel sounds: normal           Anesthesia Plan    ASA 3     general     The patient is not a current smoker.  Patient was previously instructed to abstain from smoking on day of procedure.  Patient did not smoke on day of procedure.  Education provided regarding risk of obstructive sleep apnea.  intravenous induction   Postoperative administration of opioids is intended.  Anesthetic plan and risks discussed with patient.  Use of blood  products discussed with patient who.    Plan discussed with [facility].

## 2024-01-08 NOTE — DISCHARGE INSTRUCTIONS
Patient Instructions after an endoscopy      The anesthetics, sedatives or narcotics which were given to you today will be acting in your body for the next 24 hours, so you might feel a little sleepy or groggy.  This feeling should slowly wear off. Carefully read and follow the instructions.     You received sedation today:  - Do not drive or operate any machinery or power tools of any kind.   - No alcoholic beverages today, not even beer or wine.  - Do not make any important decisions or sign any legal documents.  - No over the counter medications that contain alcohol or that may cause drowsiness.  - Do not make any important decisions or sign any legal documents.    While it is common to experience mild to moderate abdominal distention, gas, or belching after your procedure, if any of these symptoms occur following discharge from the GI Lab or within one week of having your procedure, call the [facility] to be advised whether a visit to your nearest Urgent Care or Emergency Department is indicated.  Take this paper with you if you go.     - If you develop an allergic reaction to the medications that were given during your procedure such as difficulty breathing, rash, hives, severe nausea, vomiting or lightheadedness.- If you experience chest pain, shortness of breath, severe abdominal pain, fevers and chills.    -If you develop signs and symptoms of bleeding such as blood in your spit, if your stools turn black, tarry, or bloody    - If you have not urinated within 8 hours following your procedure.- If your IV site becomes painful, red, inflamed, or looks infected.

## 2024-01-08 NOTE — ANESTHESIA POSTPROCEDURE EVALUATION
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    Anesthesia Post Evaluation    Patient location during evaluation: PACU  Patient participation: complete - patient participated  Level of consciousness: awake  Pain management: adequate  Airway patency: patent  Cardiovascular status: acceptable  Respiratory status: acceptable  Hydration status: acceptable  Postoperative Nausea and Vomiting: none        No notable events documented.

## 2024-01-08 NOTE — H&P
"History Of Present Illness  Chito Baxter is a 78 y.o. male presenting with h/o bile duct stones and h/o Guerrero's esophagus.      Recent increase in LFTs, however no pain.    Denies F/C.         Past Medical History  Past Medical History:   Diagnosis Date    Body mass index (BMI) 32.0-32.9, adult 06/15/2022    BMI 32.0-32.9,adult    Obesity, unspecified 06/15/2022    Class 1 obesity with serious comorbidity and body mass index (BMI) of 32.0 to 32.9 in adult, unspecified obesity type    Personal history of other diseases of the circulatory system 06/08/2020    History of atrial flutter    Personal history of other diseases of urinary system 12/30/2020    History of kidney disease    Personal history of other endocrine, nutritional and metabolic disease 06/14/2018    History of hypercalcemia    Personal history of other endocrine, nutritional and metabolic disease 06/08/2020    History of diabetes mellitus    Unspecified open wound, right lower leg, initial encounter 12/17/2018    Leg wound, right       Surgical History  Past Surgical History:   Procedure Laterality Date    ESOPHAGOGASTRODUODENOSCOPY  03/12/2013    Diagnostic Esophagogastroduodenoscopy    OTHER SURGICAL HISTORY  03/12/2013    ECG 12-Lead With Interpretation And Report    OTHER SURGICAL HISTORY  03/12/2013    Cardiovascular Stress Test        Social History  He reports that he has never smoked. He has never used smokeless tobacco. He reports current alcohol use. He reports that he does not use drugs.    Family History  No family history on file.     Allergies  Patient has no known allergies.    Review of Systems     Physical Exam     Last Recorded Vitals  Blood pressure 122/64, pulse 85, temperature 36.3 °C (97.3 °F), temperature source Temporal, resp. rate 18, height 1.727 m (5' 8\"), weight 99.8 kg (220 lb).    Relevant Results       Assessment/Plan   Proceed with EGD and ERCP.       I spent 5 minutes in the professional and overall care of this " patient.      Alfreda Roberts MD

## 2024-01-08 NOTE — ANESTHESIA PROCEDURE NOTES
Airway  Date/Time: 1/8/2024 8:28 AM  Urgency: elective    Airway not difficult    Staffing  Performed: YEN   Authorized by: Kathy Mahan MD    Performed by: YEN Hernandez  Patient location during procedure: OR    Indications and Patient Condition  Indications for airway management: anesthesia  Sedation level: deep  Preoxygenated: yes  Patient position: sniffing  MILS maintained throughout  Mask difficulty assessment: 1 - vent by mask    Final Airway Details  Final airway type: endotracheal airway      Successful airway: ETT  Cuffed: yes   Successful intubation technique: direct laryngoscopy  Blade: Ted  Blade size: #4  ETT size (mm): 7.5  Cormack-Lehane Classification: grade IIa - partial view of glottis  Placement verified by: chest auscultation and capnometry   Inital cuff pressure (cm H2O): 24  Measured from: lips  Number of attempts at approach: 1

## 2024-01-10 LAB
LABORATORY COMMENT REPORT: NORMAL
PATH REPORT.FINAL DX SPEC: NORMAL
PATH REPORT.GROSS SPEC: NORMAL
PATH REPORT.RELEVANT HX SPEC: NORMAL
PATH REPORT.TOTAL CANCER: NORMAL

## 2024-01-11 ENCOUNTER — TELEPHONE (OUTPATIENT)
Dept: GASTROENTEROLOGY | Facility: HOSPITAL | Age: 79
End: 2024-01-11
Payer: MEDICARE

## 2024-01-11 NOTE — TELEPHONE ENCOUNTER
Called patient to advise him that Dr. Roberts is recommending his LFTs to be checked in 2-3 weeks as a follow up. Patient did not answer, LM on VM.

## 2024-01-15 DIAGNOSIS — I10 BENIGN ESSENTIAL HYPERTENSION: Primary | ICD-10-CM

## 2024-01-15 RX ORDER — HYDROCHLOROTHIAZIDE 25 MG/1
25 TABLET ORAL
Qty: 90 TABLET | Refills: 3 | Status: SHIPPED | OUTPATIENT
Start: 2024-01-15 | End: 2024-05-10 | Stop reason: SDUPTHER

## 2024-01-24 ENCOUNTER — TELEPHONE (OUTPATIENT)
Dept: GASTROENTEROLOGY | Facility: HOSPITAL | Age: 79
End: 2024-01-24
Payer: MEDICARE

## 2024-01-24 NOTE — TELEPHONE ENCOUNTER
Spoke with the patient on the phone. Patient is aware that Dr. Roberts ordered a hepatic function lab order to be done.

## 2024-02-21 ENCOUNTER — CLINICAL SUPPORT (OUTPATIENT)
Dept: PRIMARY CARE | Facility: CLINIC | Age: 79
End: 2024-02-21
Payer: MEDICARE

## 2024-02-21 DIAGNOSIS — Z23 ENCOUNTER FOR IMMUNIZATION: Primary | ICD-10-CM

## 2024-02-21 PROCEDURE — 90471 IMMUNIZATION ADMIN: CPT | Performed by: INTERNAL MEDICINE

## 2024-02-21 PROCEDURE — 90632 HEPA VACCINE ADULT IM: CPT | Performed by: INTERNAL MEDICINE

## 2024-04-16 NOTE — PROGRESS NOTES
Subjective   Patient ID: Chito Baxter is a 78 y.o. male who presents for UR Sx      He complains of nasal drainage, congestion and productive cough with approx onset 4/7/2024  They are leaving for [place] on Saturday 4/20/2024  He tested negative for COVID   He was around family that had bronchitis.            HEALTH :  PSA 4/15, 4/19, 6/2020 and last one   Colonoscopy 3/12, 10/17 + adenoma, 10/2020 and Q 5  EKG 2012, 2013, 10/14 , 2015 , 12/16, 2018, 2022, 1/2023, 3/2023, 11/2023 cardio  Spot protein 10/15 , 10/16 , 12/17 , 12/18, 6/19, 6/2020, 6/2021, 6/2022, elevated 11/2023  Hep A 8/1/2023  Hep C 1/18 -  HD flu 10/17, 12/18, 9/19, 9/2020, 9/2021, 9/2022, 10/2023   TDAP 2006, 12/17/18, 9/28/2022  Pneumovax 2011   Prevnar 13 12/14/17  Shingrix 5/18 and 8/18  Moderna CVD 2/5/2021 and 3/5/2021 booster 11/16/2021, 3/31/2022, 9/28/2022, 6/3/2023, 9/23/2023  Ophth He saw Dr Mathews in 4/2024. He has borderline glaucoma. He had bilateral cataract extraction with IOL placement both eyes 1/2024 with Dr Mathews   Copy of his Advanced Directives scanned in his chart 6/2022        Review of Systems  All systems negative except those listed in the HPI      Objective   Visit Vitals  /60 (BP Location: Left arm, Patient Position: Sitting)   Pulse 90   Temp 36.3 °C (97.3 °F) (Temporal)    Body mass index is 33.45 kg/m².     Physical Exam  Vitals reviewed.   Constitutional:       Appearance: Normal appearance. He is obese.   HENT:      Head: Normocephalic.      Right Ear: Tympanic membrane, ear canal and external ear normal.      Left Ear: Tympanic membrane, ear canal and external ear normal.      Nose: Nose normal.      Mouth/Throat:      Pharynx: Oropharynx is clear.   Eyes:      Conjunctiva/sclera: Conjunctivae normal.   Cardiovascular:      Rate and Rhythm: Normal rate. Rhythm irregular.      Pulses: Normal pulses.      Heart sounds: Normal heart sounds.   Pulmonary:      Breath sounds: Rhonchi present. No  "wheezing.   Abdominal:      General: Bowel sounds are normal.      Palpations: Abdomen is soft.   Musculoskeletal:         General: Normal range of motion.      Cervical back: Normal range of motion and neck supple.   Skin:     General: Skin is warm.   Neurological:      General: No focal deficit present.      Mental Status: He is alert and oriented to person, place, and time.   Psychiatric:         Mood and Affect: Mood normal.         Behavior: Behavior normal.         Thought Content: Thought content normal.         Judgment: Judgment normal.         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   He works at [employer] on an as needed basis   He is working 4-8 shifts a month with 6 hour shifts and he is happy with this.      Acute bronchitis : On exam: + rhonchi, no wheezing noted 4/2024   He complains of nasal drainage, congestion and productive cough with approx onset 4/7/2024  They are leaving for [email or web address] on Saturday 4/20/2024  He tested negative for COVID   He was around family that had bronchitis.   Prescription sent in for Tessalon Perles 200 mg up to TID/prn  Prescription sent in for Augmentin 875 mg BID for 14 days, possible side effects discussed   Continue Xlear NS daily and use Afrin when flying   Encouraged rest and increased hydration with warm/ tepid fluids   He will call if Sx persist or worsen     He has no issues with his hearing to report   He had his hearing tested in the past and found to have high frequency loss      His weight in office today is 220 with BMI of 33.45. We spent 15 minutes discussing diet and weight loss. The struggle of weight loss persists    He has been shopping in the \"[employer]\" section since childhood   He is not exercising routinely      HTN: Stable.   Continue lisinopril 40 mg BID, HCTZ 25 mg daily and Norvasc 5 mg BID.   Stress test " in 1/11 was normal  PVD studies in 2/12 were normal.  ECG: Afib @ 67 nonspecific ST-T wave abnormalities in 11/2023  Recommend he monitor his BP at home and call with elevated readings   He saw Dr Mckeon in 11/2023     A-Fib: He goes in and out of a- fib but always rate controlled   Dx him with atrial fib on appt in 12/16. S/p unsuccessful DCCV 1-6-17.    ECHO was normal, cardioversion failed x 3.   Continue Sotalol 120 mg BID and Eliquis 5 mg daily.  He is monitoring his EKG on the [employer].    Continue monitoring BP at home and call with elevated readings   He saw Dr Mckeon in 11/2023 and per notes: Continue sotalol for now--once he has persistent Afib, stop sotalol and start pure beta blocker.      We discussed the patients cardiovascular risk. If needed, lifestyle modifications recommended including: behavioral therapies of nutrition choices, exercise and eliminate habits that are contributing to their cardiac risk. We agreed to a plan to decrease his cardiovascular risks. Discussed ASA. Reviewed Guidelines and approved recommendations made to patient.   The patient's 10 yr CV risk was estimated at : ACO score 6/6 IO 4/2024      HLD: LDL 37 and HDL 31 on labs in 4/2024   I would like to see his HDL between 55- 60   He stopped Lipitor due to liver functions on labs in 11/2023.  Ca cardiac score 5/2021 was 339 in LAD and atelectasis noted only   Recommend he look into a plant based/ whole foods diet      Hypothyroid: TSH 3.31 on labs in 4/2024  Continue levothyroxine 50 mcg daily.     DM: HbA1c was 6.5 % on labs in 4/2024. Recommend he consider adding Ozempic to assist with DM control and weight loss   Diabetic Composite Score 5/5 IO 4/2024    Continue metformin 1000 mg BID and glipizide 5 mg BID.   He exercises daily and wears support stockings when he travels.   Spot protein was elevated on labs in 11/2023  Recommend he monitor his feet nightly for skin breakdown and ulcerations   Recommend he look into a  plant based/ whole foods diet      Renal insuff: Ast 499 alt 335, ast 153, alt 255 11/28/2023, and 12/2/2023 ast 26, alt 22. GFR is 53. He admits he was taking Tylenol BID but he has since stopped and he stopped Lipitor also   RUQ US 11/2023 was normal   We will continue to monitor  He has an appt scheduled with Dr Roberts today 12/4/2023     Guerrero's esophagus:  ERCP 1/30/23 which showed Esophageal mucosal changes 6 suggestive of Guerrero's esophagus were noted and biopsied. Nonbleeding gastric ulcer with no stigmata of bleeding was biopsied, duodenal erosions without bleeding were noted. 1 partially occluded stent noted in the duodenum was removed. Choledocholithiasis was noted and successfully removed via biliary sphincterotomy and balloon extraction.  Continue Protonix 40 mg daily   He saw Dr Roberts 3/2023. He has an appt scheduled with Dr Roberts today 12/4/2023     Seasonal allergies:  Continue loratadine 10 mg prn use      -S/p lap cholecystectomy and #15 Uzbek drain was placed in the Morison's pouch 1/25/2023, s/p ERCP and sphincterotomy  He saw Dr Roberts in 3/2023      Renal cyst:  The kidney cyst takes up 20% of volume.      Nocturia:  He has 1-3 episodes at night depending on fluid intake prior to bed.     Diarrhea:   He has semi-liquid soft stools and rectal leakage with exertion.   Recommend Metamucil daily.      Melena :   Admitted on 1/29/2023 and discharged on 1/31/2023   ERCP 1/30/23 which showed Esophageal mucosal changes 6 suggestive of Guerrero's esophagus were noted and biopsied. Nonbleeding gastric ulcer with no stigmata of bleeding was biopsied, duodenal erosions without bleeding were noted. 1 partially occluded stent noted in the duodenum was removed.   Choledocholithiasis was noted and successfully removed via biliary sphincterotomy and balloon extraction.  Continue Protonix 40 mg daily   He saw Dr Roberts in 2/2023 and has an appt with her again 12/4/2023     Balance issues:  He declines  balance therapy.  Recommend he try Pa Chi, encouraged him to modify his activity levels (not going up ladders, etc.     Venous insufficiency: he has varicose veins and chronic scarring from venous insufficiency LE bilaterally.  Continue wearing support stockings with travel.  He will be careful when he first gets up due to venous pooling.   Norvasc can contribute to this.      Skin issues:  He has hyperkeratosis on the auricles of his ears and seeing dermatology  He uses creams and sometimes derm freezes the area    He saw Dr Lozano and had some laser to the face 12/19  He used 5FU cream as prescribed by dermatology in 2021     Vitamin D deficiency: Vitamin D 66 on labs in 12/2021   Continue OTC Vitamin D 2000 units daily.      Uric Acid levels in 11/2023 was 9.5.  We discussed diet. Recommend he begin allopurinol.    Prescription sent in for allopurinol 100 mg daily, possible side effects discussed with medication      PSA normal in 6/2020 and last one   Colonoscopy in 10/2020 and Q 5.      Ophth: He is having cataract evaluation in 2 weeks with Dr Mathews    He saw Dr Mathews in 10/2023. yearly. He has borderline glaucoma bilaterally with narrow angles, cataracts bilaterally that are now visually significant and PVD bilat that is stable.      His wife is his medical POA and then his daughters. He has a living will.   Copy of his Advanced Directives scanned in his chart 6/2022     Hep A 8/1/2023  Hep C 1/18 -  HD flu 10/17, 12/18, 9/19, 9/2020, 9/2021, 9/2022, 2023  TDAP 2006, 12/17/18, 9/28/2022  Pneumovax 2011   Prevnar 13 12/14/17  Shingrix 5/18 and 8/18  Moderna CVD 2/5/2021 and 3/5/2021 booster 11/16/2021, 3/31/2022, 9/28/2022, 6/3/2023, 9/23/2023     Some elements in the chart were copied from Dr. Perez's last office visit with patient. Notes have been updated where appropriate, and reflect my current medical decision making from today.      RTC in 6 months for MCR or sooner if needed   (MCR due 6/2024)      Scribe Attestation  By signing my name below, I, Leonor Abebe   attest that this documentation has been prepared under the direction and in the presence of Chichi Perez MD.

## 2024-04-17 ENCOUNTER — OFFICE VISIT (OUTPATIENT)
Dept: PRIMARY CARE | Facility: CLINIC | Age: 79
End: 2024-04-17
Payer: MEDICARE

## 2024-04-17 VITALS
HEIGHT: 68 IN | BODY MASS INDEX: 33.34 KG/M2 | SYSTOLIC BLOOD PRESSURE: 116 MMHG | TEMPERATURE: 97.3 F | WEIGHT: 220 LBS | HEART RATE: 90 BPM | DIASTOLIC BLOOD PRESSURE: 60 MMHG | OXYGEN SATURATION: 95 %

## 2024-04-17 DIAGNOSIS — I48.3 TYPICAL ATRIAL FLUTTER (MULTI): ICD-10-CM

## 2024-04-17 DIAGNOSIS — J20.9 ACUTE BRONCHITIS, UNSPECIFIED ORGANISM: ICD-10-CM

## 2024-04-17 DIAGNOSIS — E11.9 CONTROLLED TYPE 2 DIABETES MELLITUS WITHOUT COMPLICATION, WITHOUT LONG-TERM CURRENT USE OF INSULIN (MULTI): ICD-10-CM

## 2024-04-17 DIAGNOSIS — I44.1 MOBITZ TYPE I INCOMPLETE ATRIOVENTRICULAR BLOCK: ICD-10-CM

## 2024-04-17 DIAGNOSIS — N18.32 STAGE 3B CHRONIC KIDNEY DISEASE (MULTI): ICD-10-CM

## 2024-04-17 DIAGNOSIS — I50.42 CHRONIC COMBINED SYSTOLIC AND DIASTOLIC HEART FAILURE (MULTI): Primary | ICD-10-CM

## 2024-04-17 DIAGNOSIS — I10 BENIGN ESSENTIAL HYPERTENSION: ICD-10-CM

## 2024-04-17 DIAGNOSIS — E66.09 CLASS 1 OBESITY DUE TO EXCESS CALORIES WITH SERIOUS COMORBIDITY AND BODY MASS INDEX (BMI) OF 33.0 TO 33.9 IN ADULT: ICD-10-CM

## 2024-04-17 PROBLEM — E11.22 TYPE 2 DIABETES MELLITUS WITH STAGE 4 CHRONIC KIDNEY DISEASE, WITHOUT LONG-TERM CURRENT USE OF INSULIN (MULTI): Status: RESOLVED | Noted: 2023-01-31 | Resolved: 2024-04-17

## 2024-04-17 PROBLEM — E11.22 TYPE 2 DIABETES MELLITUS WITH STAGE 4 CHRONIC KIDNEY DISEASE, WITHOUT LONG-TERM CURRENT USE OF INSULIN (MULTI): Status: ACTIVE | Noted: 2023-01-31

## 2024-04-17 PROBLEM — E66.01 SEVERE OBESITY (BMI 35.0-39.9) WITH COMORBIDITY (MULTI): Status: RESOLVED | Noted: 2023-01-31 | Resolved: 2024-04-17

## 2024-04-17 PROBLEM — N18.4 TYPE 2 DIABETES MELLITUS WITH STAGE 4 CHRONIC KIDNEY DISEASE, WITHOUT LONG-TERM CURRENT USE OF INSULIN (MULTI): Status: RESOLVED | Noted: 2023-01-31 | Resolved: 2024-04-17

## 2024-04-17 PROBLEM — N18.4 TYPE 2 DIABETES MELLITUS WITH STAGE 4 CHRONIC KIDNEY DISEASE, WITHOUT LONG-TERM CURRENT USE OF INSULIN (MULTI): Status: ACTIVE | Noted: 2023-01-31

## 2024-04-17 PROCEDURE — 99214 OFFICE O/P EST MOD 30 MIN: CPT | Performed by: INTERNAL MEDICINE

## 2024-04-17 PROCEDURE — 1160F RVW MEDS BY RX/DR IN RCRD: CPT | Performed by: INTERNAL MEDICINE

## 2024-04-17 PROCEDURE — 3078F DIAST BP <80 MM HG: CPT | Performed by: INTERNAL MEDICINE

## 2024-04-17 PROCEDURE — 1036F TOBACCO NON-USER: CPT | Performed by: INTERNAL MEDICINE

## 2024-04-17 PROCEDURE — 1157F ADVNC CARE PLAN IN RCRD: CPT | Performed by: INTERNAL MEDICINE

## 2024-04-17 PROCEDURE — 1159F MED LIST DOCD IN RCRD: CPT | Performed by: INTERNAL MEDICINE

## 2024-04-17 PROCEDURE — 3074F SYST BP LT 130 MM HG: CPT | Performed by: INTERNAL MEDICINE

## 2024-04-17 PROCEDURE — G2211 COMPLEX E/M VISIT ADD ON: HCPCS | Performed by: INTERNAL MEDICINE

## 2024-04-17 RX ORDER — AMOXICILLIN AND CLAVULANATE POTASSIUM 875; 125 MG/1; MG/1
875 TABLET, FILM COATED ORAL 2 TIMES DAILY
Qty: 20 TABLET | Refills: 0 | Status: SHIPPED | OUTPATIENT
Start: 2024-04-17 | End: 2024-04-27

## 2024-04-17 RX ORDER — BENZONATATE 200 MG/1
200 CAPSULE ORAL 3 TIMES DAILY PRN
Qty: 42 CAPSULE | Refills: 0 | Status: SHIPPED | OUTPATIENT
Start: 2024-04-17 | End: 2024-05-17

## 2024-04-17 ASSESSMENT — PATIENT HEALTH QUESTIONNAIRE - PHQ9
SUM OF ALL RESPONSES TO PHQ9 QUESTIONS 1 AND 2: 0
1. LITTLE INTEREST OR PLEASURE IN DOING THINGS: NOT AT ALL
2. FEELING DOWN, DEPRESSED OR HOPELESS: NOT AT ALL

## 2024-04-23 PROBLEM — K80.50 CHOLEDOCHOLITHIASIS: Chronic | Status: ACTIVE | Noted: 2024-04-23

## 2024-04-23 NOTE — PROGRESS NOTES
Subjective   Patient ID: Chito Baxter is a 78 y.o. male who presents for Elevated LFTs.    HPI  LFTs reviewed:      Bili 0.8      No recurrent abdominal pain.    Denies fever or chills.     Review of Systems  10 systems reviewed and otherwise negative.    Objective   Physical Exam    Assessment/Plan   H/o choledocholithiasis, now with fluctuating LFTs.  This could suggest recurrent stone, ampullary stenosis/incomplete sphincterotomy.      Due for repeat EGD, thus will proceed with ERCP at same setting to exclude above.           Alfreda Roberts MD 2:34 PM

## 2024-05-09 DIAGNOSIS — K25.7 CHRONIC GASTRIC ULCER WITHOUT HEMORRHAGE AND WITHOUT PERFORATION: ICD-10-CM

## 2024-05-09 RX ORDER — PANTOPRAZOLE SODIUM 40 MG/1
40 TABLET, DELAYED RELEASE ORAL
Qty: 90 TABLET | Refills: 11 | Status: SHIPPED | OUTPATIENT
Start: 2024-05-09

## 2024-05-10 DIAGNOSIS — E03.9 HYPOTHYROIDISM, UNSPECIFIED TYPE: ICD-10-CM

## 2024-05-10 DIAGNOSIS — E11.69 TYPE 2 DIABETES MELLITUS WITH OTHER SPECIFIED COMPLICATION, UNSPECIFIED WHETHER LONG TERM INSULIN USE (MULTI): Primary | ICD-10-CM

## 2024-05-10 DIAGNOSIS — E11.9 CONTROLLED TYPE 2 DIABETES MELLITUS WITHOUT COMPLICATION, WITHOUT LONG-TERM CURRENT USE OF INSULIN (MULTI): ICD-10-CM

## 2024-05-10 DIAGNOSIS — I10 PRIMARY HYPERTENSION: ICD-10-CM

## 2024-05-10 DIAGNOSIS — E79.0 ELEVATED URIC ACID IN BLOOD: ICD-10-CM

## 2024-05-10 DIAGNOSIS — E78.2 MIXED HYPERLIPIDEMIA: ICD-10-CM

## 2024-05-10 DIAGNOSIS — I10 BENIGN ESSENTIAL HYPERTENSION: ICD-10-CM

## 2024-05-10 DIAGNOSIS — N18.32 STAGE 3B CHRONIC KIDNEY DISEASE (MULTI): ICD-10-CM

## 2024-05-10 RX ORDER — HYDROCHLOROTHIAZIDE 25 MG/1
25 TABLET ORAL
Qty: 90 TABLET | Refills: 3 | Status: SHIPPED | OUTPATIENT
Start: 2024-05-10 | End: 2025-05-10

## 2024-05-10 RX ORDER — LEVOTHYROXINE SODIUM 50 UG/1
50 TABLET ORAL DAILY
Qty: 90 TABLET | Refills: 3 | Status: SHIPPED | OUTPATIENT
Start: 2024-05-10

## 2024-05-10 RX ORDER — ALLOPURINOL 100 MG/1
100 TABLET ORAL DAILY
Qty: 90 TABLET | Refills: 3 | Status: SHIPPED | OUTPATIENT
Start: 2024-05-10 | End: 2025-05-10

## 2024-05-10 RX ORDER — METFORMIN HYDROCHLORIDE 1000 MG/1
1000 TABLET ORAL
Qty: 180 TABLET | Refills: 3 | Status: SHIPPED | OUTPATIENT
Start: 2024-05-10

## 2024-05-10 RX ORDER — GLIPIZIDE 5 MG/1
5 TABLET ORAL 2 TIMES DAILY
Qty: 180 TABLET | Refills: 3 | Status: SHIPPED | OUTPATIENT
Start: 2024-05-10

## 2024-05-10 RX ORDER — BLOOD SUGAR DIAGNOSTIC
1 STRIP MISCELLANEOUS 2 TIMES DAILY
Qty: 100 STRIP | Refills: 2 | Status: SHIPPED | OUTPATIENT
Start: 2024-05-10 | End: 2024-05-11 | Stop reason: SDUPTHER

## 2024-05-10 RX ORDER — AMLODIPINE BESYLATE 5 MG/1
5 TABLET ORAL 2 TIMES DAILY
Qty: 180 TABLET | Refills: 3 | Status: SHIPPED | OUTPATIENT
Start: 2024-05-10

## 2024-05-10 RX ORDER — ATORVASTATIN CALCIUM 10 MG/1
10 TABLET, FILM COATED ORAL
Qty: 90 TABLET | Refills: 3 | Status: SHIPPED | OUTPATIENT
Start: 2024-05-10 | End: 2025-05-10

## 2024-05-10 RX ORDER — LISINOPRIL 40 MG/1
40 TABLET ORAL 2 TIMES DAILY
Qty: 180 TABLET | Refills: 3 | Status: SHIPPED | OUTPATIENT
Start: 2024-05-10

## 2024-05-11 DIAGNOSIS — E11.9 CONTROLLED TYPE 2 DIABETES MELLITUS WITHOUT COMPLICATION, WITHOUT LONG-TERM CURRENT USE OF INSULIN (MULTI): ICD-10-CM

## 2024-05-11 DIAGNOSIS — E11.69 TYPE 2 DIABETES MELLITUS WITH OTHER SPECIFIED COMPLICATION, UNSPECIFIED WHETHER LONG TERM INSULIN USE (MULTI): ICD-10-CM

## 2024-05-11 RX ORDER — LANCETS
EACH MISCELLANEOUS
Qty: 200 EACH | Refills: 3 | Status: SHIPPED | OUTPATIENT
Start: 2024-05-11

## 2024-05-11 RX ORDER — BLOOD SUGAR DIAGNOSTIC
1 STRIP MISCELLANEOUS 2 TIMES DAILY
Qty: 200 STRIP | Refills: 3 | Status: SHIPPED | OUTPATIENT
Start: 2024-05-11 | End: 2025-05-11

## 2024-06-03 NOTE — PROGRESS NOTES
Subjective   Reason for Visit: Chito Baxter is an 78 y.o. male here for his Subsequent Medicare Assessment, annual physical and follow up       His BNP fluctuates due to heart failure.    He has no urinary issues to report     He has loose stools most of the time.  There are days he has formed stool     His energy levels are fine     He saw derm and had Mohs of BCC on the side of his nose 5/2024  He saw ophthalmology this morning        HEALTH :  PSA 4/15, 4/19, 6/20 and no longer needs to repeat   Colon 3/12, 10/17 adenoma, 10/20 and Q 5  EKG 2012, 2013, 10/14, 2015, 12/16, 2018, 2022, 1/23, 3/23, 11/23 cardio  Spot protein 10/15, 10/16, 12/17, 12/18, 6/19, 6/20, 6/21, 6/22, 11/23  Hep A 8/1/2023  Hep C 1/18 -  HD flu 10/17, 12/18, 9/19, 9/20, 9/21, 9/22, 10/23   TDAP 2006, 12/17/18, 9/28/2022  RSV completed 2023  Pneumovax 2011   Prevnar 13 12/14/17  Shingrix 5/18 and 8/18  Moderna CVD 2/21 and 3/21 booster 11/21, 3/22, 9/22, 6/23, 9/23  Ophth He saw Dr Mathews in 6/2024. He has borderline glaucoma. He had bilateral cataract extraction with IOL placement both eyes 1/2024 with Dr Mathews. No MD or DR   Copy of his Advanced Directives scanned in his chart 6/2022       Patient Care Team:  Chichi Perez MD as PCP - General  Chichi Perez MD as PCP - [employer] PCP     Review of Systems  All systems negative except those listed in the HPI      Past Medical, Surgical, and Family History reviewed and updated in chart.  Reviewed all medications by prescribing practitioner or clinical pharmacist   (such as prescriptions, OTCs, herbal therapies and supplements) and documented in the medical record      Objective   Vitals:  Visit Vitals  /70 (BP Location: Left arm, Patient Position: Sitting)   Pulse 60   Temp 36.3 °C (97.4 °F) (Temporal)    Body mass index is 35.28 kg/m².      Physical Exam  Vitals reviewed.   Constitutional:       Appearance: Normal appearance. He is obese.   HENT:       Head: Normocephalic.      Right Ear: Tympanic membrane, ear canal and external ear normal.      Left Ear: Tympanic membrane, ear canal and external ear normal.      Nose: Nose normal.      Mouth/Throat:      Pharynx: Oropharynx is clear.   Eyes:      Conjunctiva/sclera: Conjunctivae normal.   Cardiovascular:      Rate and Rhythm: Normal rate and regular rhythm.      Pulses: Normal pulses.      Heart sounds: Normal heart sounds.      Comments: pitting edema 3/4 up the legs bilat > right   Pulmonary:      Effort: Pulmonary effort is normal.      Breath sounds: Normal breath sounds.   Abdominal:      General: Bowel sounds are normal.      Palpations: Abdomen is soft.      Comments: Borborygmus    Musculoskeletal:         General: Normal range of motion.      Cervical back: Normal range of motion and neck supple.   Skin:     General: Skin is warm.   Neurological:      General: No focal deficit present.      Mental Status: He is alert and oriented to person, place, and time.   Psychiatric:         Mood and Affect: Mood normal.         Behavior: Behavior normal.         Thought Content: Thought content normal.         Judgment: Judgment normal.       Assessment/Plan   Problem List Items Addressed This Visit       Choledocholithiasis (Chronic)    Atrial flutter (Multi)    Guerrero's esophagus    Benign essential hypertension    Chronic combined systolic and diastolic heart failure (Multi)    Controlled type 2 diabetes mellitus without complication, without long-term current use of insulin (Multi)    Edema, unspecified    Hyperlipidemia    Hypothyroidism    Mobitz type I incomplete atrioventricular block    Stage 3b chronic kidney disease (Multi)     Other Visit Diagnoses       Routine general medical examination at health care facility    -  Primary           Subsequent Medicare Assessment, annual physical and follow up completed   Reviewed his labs from 4/2024     Medicare Wellness completed  -  Discussed healthy diet and  "regular exercise.    -  Physical exam overall unremarkable. Immunizations reviewed and updated accordingly. Healthy lifestyle choices discussed (tobacco avoidance, appropriate alcohol use, avoidance of illicit substances).   -  Patient is wearing seatbelt.   -  Screening lab work ordered as indicated.    -  Age appropriate screening tests reviewed with patient.         We spent 5 minutes discussing depression screen and there is nothing found that is of concern for underling depression. The PQH form was filled and the meds reviewed. No depression to report      --> We spent 5 minutes discussing alcohol use and there are no concerns about overuse. The 5 min was spent in going over any issues of use of alcohol.   He has 1 EtOH drink a day.      He has grab bars in the shower.  He has not fallen recently and no risk of falls in the house   He has good lighting around the house and functioning smoke detectors.     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   He works at [employer] on an as needed basis   He is working 4-8 shifts a month with 6 hour shifts and he is happy with this.     He has no issues with his hearing to report   He had his hearing tested in the past and found to have high frequency loss      His weight in office is 232 with BMI of 35.28. We spent 15 minutes discussing diet and weight loss. The struggle of weight loss persists    He has been shopping in the \"[employer] section since childhood   He is not exercising routinely      HTN: Stable.   Continue lisinopril 40 mg BID, HCTZ 25 mg daily and Norvasc 5 mg BID.   Stress test in 1/11 was normal  PVD studies in 2/12 were normal.  ECG: Afib @ 67 nonspecific ST-T wave abnormalities in 11/2023  Recommend he monitor his BP at home and call with elevated readings   He saw Dr Mckeon in 11/2023     A-Fib: On exam: rate regular 2/2024   Dx him with atrial fib on appt in 12/16. S/p unsuccessful DCCV " 1-6-17.    ECHO was normal, cardioversion failed x 3.   Continue Sotalol 120 mg BID and Eliquis 5 mg daily.  He is monitoring his EKG on the [facility].    Continue monitoring BP at home and call with elevated readings   He saw Dr Mckeon in 11/2023 and per notes: Continue sotalol for now--once he has persistent Afib, stop sotalol and start pure beta blocker.      I spent 15 minutes face to face discussing his cardiac risks.   We discussed the patients cardiovascular risk. If needed, lifestyle modifications recommended including: behavioral therapies of nutrition choices, exercise and eliminate habits that are contributing to their cardiac risk. We agreed to a plan to decrease his cardiovascular risks. Discussed ASA. Reviewed Guidelines and approved recommendations made to patient.   The patient's 10 yr CV risk was estimated at : ACO score 4/6 IO 6/2024      HLD: LDL 37 and HDL 31 on labs in 4/2024   I would like to see his HDL between 55- 60. Increase exercise   He stopped Lipitor due to liver functions on labs in 11/2023.  Ca cardiac score 5/2021 was 339 in LAD and atelectasis noted only   Recommend he look into a plant based/ whole foods diet      Hypothyroid: TSH 3.31 on labs in 4/2024  Continue levothyroxine 50 mcg daily.     DM: HbA1c was 6.5 % on labs in 4/2024. Discussed Jardiance to help with diabetes control and renal preservation and decrease volume. He will discuss this further with Dr Roberts at his next appt 6/2024  Diabetic Composite Score 5/5 IO 6/2024    Continue metformin 1000 mg BID and glipizide 5 mg BID.   He exercises daily and wears support stockings when he travels.   Spot protein was elevated on labs in 11/2023  Recommend he monitor his feet nightly for skin breakdown and ulcerations   Recommend he look into a plant based/ whole foods diet      Renal insuff: Discussed Jardiance to help with diabetes control and renal preservation and decrease volume. He will discuss this further with   Alejandra at his next appt 6/2024   RUQ US 11/2023 was normal   We will continue to monitor  He saw Dr Roberts 12/23    BLE edema: On exam: pitting edema 3/4 up the legs bilat > right 6/2024   Recommend he wear support stockings daily   Recommend he monitor his daily sodium intake   Recommend he elevate his legs for 45 minutes at night and when possible throughout the day  Offered the patient a diuretic, he declines 6/24     Guerrero's esophagus:  Continue Protonix 40 mg daily    EGD 1/2024 Irregular Z-line 43 cm from the incisors Bx -No intestinal metaplasia, dysplasia or malignancy is identified   ERCP 1/2024 The major papilla showed evidence of a previous biliary sphincterotomy, which was noted to be stenosed. The common bile duct was cannulated using an extraction balloon with guidewire. Contrast was injected. Multiple sweeps were performed in the common bile duct using a balloon. Sludge and debris were removed, achieving complete clearance. Completion/extension biliary sphincterotomy was performed using a sphincterotome. Sweeping was again performed in the common bile duct. No resistance to balloon sweep at conclusion.    He saw Dr Roberts 12/23      Seasonal allergies:  Continue loratadine 10 mg prn use      -S/p lap cholecystectomy and #15 Lithuanian drain was placed in the Morison's pouch 1/25/2023, s/p ERCP and sphincterotomy  He saw Dr Roberts in 12/23     Renal cyst:  The kidney cyst takes up 20% of volume.   He saw Dr Roberts 12/23       Diarrhea:   He has semi-liquid soft stools and rectal leakage with exertion.   Recommend Metamucil daily.      Balance issues:  He declines balance therapy.  Recommend he try Pa Chi, encouraged him to modify his activity levels (not going up ladders, etc.     Venous insufficiency: he has varicose veins and chronic scarring from venous insufficiency LE bilaterally.  Continue wearing support stockings with travel.  He will be careful when he first gets up due to venous pooling.    Norvasc can contribute to this.      Skin issues:  He has hyperkeratosis on the auricles of his ears and seeing dermatology  He uses creams and sometimes derm freezes the area    He saw Dr Lozano and had some laser to the face 12/19  He used 5FU cream as prescribed by dermatology in 2021  He saw derm and had Mohs of BCC on the side of his nose 5/2024      Vitamin D deficiency: Vitamin D 66 on labs in 12/2021   Continue OTC Vitamin D 2000 units daily.      Uric Acid levels in 11/2023 was 9.5.  Continue allopurinol 100 mg daily     PSA normal in 6/2020 and no longer needs to repeat    Colonoscopy in 10/2020 and Q 5.      Ophth:    He saw Dr Mathews in 6/2024. He has borderline glaucoma. He had bilateral cataract extraction with IOL placement both eyes 1/2024 with Dr Mathews. No MD or DR GAN spent 15 min with the patient discussing their wishes for end of life choices.   We discussed the need for a Living Will and that wishes should be discussed with Family. The DNR status was reviewed, and we discussed the options of this and, the DNR _CC options as well.   We also went over how important it was to have these choices written down and clear for any surviving family so that their wishes are followed   The patient and I came to to following agreement :   His wife is his medical POA and then his daughters. He has a living will.   Copy of his Advanced Directives scanned in his chart 6/2022     Hep A 8/1/2023  Hep C 1/18 -  HD flu 10/17, 12/18, 9/19, 9/20, 9/21, 9/22, 10/23   TDAP 2006, 12/17/18, 9/28/2022  RSV completed 2023  Pneumovax 2011   Prevnar 13 12/14/17  Shingrix 5/18 and 8/18  Moderna CVD 2/21 and 3/21 booster 11/21, 3/22, 9/22, 6/23, 9/23     Some elements in the chart were copied from Dr. Perez's last office visit with patient. Notes have been updated where appropriate, and reflect my current medical decision making from today.      RTC in 6 months for follow up or sooner if needed   (MCR due 6/2025, last  mcr 6/4/2024)     [employer] Attestation  By signing my name below, I, Leonor Abebe   attest that this documentation has been prepared under the direction and in the presence of Chichi Perez MD.

## 2024-06-04 ENCOUNTER — OFFICE VISIT (OUTPATIENT)
Dept: PRIMARY CARE | Facility: CLINIC | Age: 79
End: 2024-06-04
Payer: MEDICARE

## 2024-06-04 VITALS
BODY MASS INDEX: 35.16 KG/M2 | WEIGHT: 232 LBS | DIASTOLIC BLOOD PRESSURE: 70 MMHG | HEART RATE: 60 BPM | TEMPERATURE: 97.4 F | HEIGHT: 68 IN | SYSTOLIC BLOOD PRESSURE: 110 MMHG | OXYGEN SATURATION: 98 %

## 2024-06-04 DIAGNOSIS — K80.50 CHOLEDOCHOLITHIASIS: Chronic | ICD-10-CM

## 2024-06-04 DIAGNOSIS — E03.9 HYPOTHYROIDISM, UNSPECIFIED TYPE: ICD-10-CM

## 2024-06-04 DIAGNOSIS — R60.0 LOCALIZED EDEMA: ICD-10-CM

## 2024-06-04 DIAGNOSIS — E78.2 MIXED HYPERLIPIDEMIA: ICD-10-CM

## 2024-06-04 DIAGNOSIS — I50.42 CHRONIC COMBINED SYSTOLIC AND DIASTOLIC HEART FAILURE (MULTI): ICD-10-CM

## 2024-06-04 DIAGNOSIS — I44.1 MOBITZ TYPE I INCOMPLETE ATRIOVENTRICULAR BLOCK: ICD-10-CM

## 2024-06-04 DIAGNOSIS — I10 BENIGN ESSENTIAL HYPERTENSION: ICD-10-CM

## 2024-06-04 DIAGNOSIS — Z00.00 ROUTINE GENERAL MEDICAL EXAMINATION AT HEALTH CARE FACILITY: Primary | ICD-10-CM

## 2024-06-04 DIAGNOSIS — K22.70 BARRETT'S ESOPHAGUS WITHOUT DYSPLASIA: ICD-10-CM

## 2024-06-04 DIAGNOSIS — E11.9 CONTROLLED TYPE 2 DIABETES MELLITUS WITHOUT COMPLICATION, WITHOUT LONG-TERM CURRENT USE OF INSULIN (MULTI): ICD-10-CM

## 2024-06-04 DIAGNOSIS — I48.3 TYPICAL ATRIAL FLUTTER (MULTI): ICD-10-CM

## 2024-06-04 DIAGNOSIS — N18.32 STAGE 3B CHRONIC KIDNEY DISEASE (MULTI): ICD-10-CM

## 2024-06-04 PROCEDURE — 1170F FXNL STATUS ASSESSED: CPT | Performed by: INTERNAL MEDICINE

## 2024-06-04 PROCEDURE — G0446 INTENS BEHAVE THER CARDIO DX: HCPCS | Performed by: INTERNAL MEDICINE

## 2024-06-04 PROCEDURE — 1159F MED LIST DOCD IN RCRD: CPT | Performed by: INTERNAL MEDICINE

## 2024-06-04 PROCEDURE — 1158F ADVNC CARE PLAN TLK DOCD: CPT | Performed by: INTERNAL MEDICINE

## 2024-06-04 PROCEDURE — 1157F ADVNC CARE PLAN IN RCRD: CPT | Performed by: INTERNAL MEDICINE

## 2024-06-04 PROCEDURE — 1123F ACP DISCUSS/DSCN MKR DOCD: CPT | Performed by: INTERNAL MEDICINE

## 2024-06-04 PROCEDURE — 3074F SYST BP LT 130 MM HG: CPT | Performed by: INTERNAL MEDICINE

## 2024-06-04 PROCEDURE — 1036F TOBACCO NON-USER: CPT | Performed by: INTERNAL MEDICINE

## 2024-06-04 PROCEDURE — 1160F RVW MEDS BY RX/DR IN RCRD: CPT | Performed by: INTERNAL MEDICINE

## 2024-06-04 PROCEDURE — G0439 PPPS, SUBSEQ VISIT: HCPCS | Performed by: INTERNAL MEDICINE

## 2024-06-04 PROCEDURE — 99397 PER PM REEVAL EST PAT 65+ YR: CPT | Performed by: INTERNAL MEDICINE

## 2024-06-04 PROCEDURE — 99214 OFFICE O/P EST MOD 30 MIN: CPT | Performed by: INTERNAL MEDICINE

## 2024-06-04 PROCEDURE — 3078F DIAST BP <80 MM HG: CPT | Performed by: INTERNAL MEDICINE

## 2024-06-04 PROCEDURE — G0442 ANNUAL ALCOHOL SCREEN 15 MIN: HCPCS | Performed by: INTERNAL MEDICINE

## 2024-06-04 ASSESSMENT — PATIENT HEALTH QUESTIONNAIRE - PHQ9
2. FEELING DOWN, DEPRESSED OR HOPELESS: NOT AT ALL
SUM OF ALL RESPONSES TO PHQ9 QUESTIONS 1 AND 2: 0
1. LITTLE INTEREST OR PLEASURE IN DOING THINGS: NOT AT ALL

## 2024-06-04 ASSESSMENT — ENCOUNTER SYMPTOMS
DEPRESSION: 0
OCCASIONAL FEELINGS OF UNSTEADINESS: 0
LOSS OF SENSATION IN FEET: 0

## 2024-06-04 ASSESSMENT — ACTIVITIES OF DAILY LIVING (ADL)
BATHING: INDEPENDENT
DRESSING: INDEPENDENT

## 2024-06-04 NOTE — PATIENT INSTRUCTIONS
Current weight: 105 kg (232 lb)  Weight change since last visit (-) denotes wt loss 12 lbs   Weight loss needed to achieve BMI 25: 67.9 Lbs  Weight loss needed to achieve BMI 30: 35.1 Lbs    It was a pleasure to see you today.  I would like to remind you about importance of a healthy lifestyle in order to improve your well-being and live longer. Try to engage in physical activities for at least 150 minutes per week.  Eat about 10 servings of fruits and vegetables daily. My advice is 2 servings of fruits and 8 servings of vegetables. For vegetables choose at least half of them green and at least half of them fresh.  Please avoid sugar, salt, fried food and saturated fat.  Weight loss is advised. Target BMI: below 25. Please follow low carbohydrate diet and daily exercise routine for at least 30 minutes. Nutritional consultation is available, please let me know if you are interested. I will be happy to discuss details with you if interested.   Have a good day and stay well.      Obesity is a chronic, relapsing, progressive, treatable, multifactorial, neurobehavioral disease, where in an increase in body fat promotes adipose (fat) tissue dysfunction, as well as biomechanical stress on the body which can result in adverse metabolic, biomechanical, and psychosocial health consequences, in addition to reducing quality of life and lifespan.   Without treatment, obesity is likely to progress and worsen, further increase the patient's risk for numerous health conditions caused by excess adiposity and increasing the risk for premature death.     - Counseling provided on impact of diet, physical activity, stress & sleep in treatment of obesity.    - Counseled on reduced calorie, high protein, high fiber, whole foods diet.  Counseling on benefits of avoidance of frequent intake of processed foods and liquid calories.   - Counseled on behavioral modification strategies and tools to support weight loss.   - Reviewed  pathophysiology of obesity in context of relationship to nutrition, energy balance and environmental factors that influence nutrition and energy balance outcomes.  - Counseled on various behavioral strategies and self monitoring practices to enhance understanding and individual assessment of energy balance, how various changes in types of foods consumed and macronutrient distribution can impact appetite regulation and be used as a tool in treatment of obesity.       The ability to age comfortably depends on how you invest in your body.   Include physical activity in your daily routine. Physical activity increases blood flow to your whole body, including your brain. ...  Eat a healthy diet. A heart-healthy diet may benefit your brain.   Stay mentally active. Be social.   Treat cardiovascular disease.  No smoking, excessive EtOH intake or illicit drug use.

## 2024-06-04 NOTE — PROGRESS NOTES
"Subjective   Reason for Visit: Chito Baxter is an 78 y.o. male here for a Medicare Wellness visit.     Past Medical, Surgical, and Family History reviewed and updated in chart.    Reviewed all medications by prescribing practitioner or clinical pharmacist (such as prescriptions, OTCs, herbal therapies and supplements) and documented in the medical record.    HPI    Patient Care Team:  Chichi Perez MD as PCP - General  Chichi Perez MD as PCP - [employer] PCP     Review of Systems    Objective   Vitals:  /70 (BP Location: Left arm, Patient Position: Sitting)   Pulse 60   Temp 36.3 °C (97.4 °F) (Temporal)   Ht 1.727 m (5' 8\")   Wt 105 kg (232 lb)   SpO2 98%   BMI 35.28 kg/m²       Physical Exam    Assessment/Plan   Problem List Items Addressed This Visit    None  Visit Diagnoses     Routine general medical examination at health care facility    -  Primary               "

## 2024-10-21 ENCOUNTER — TELEPHONE (OUTPATIENT)
Dept: PRIMARY CARE | Facility: CLINIC | Age: 79
End: 2024-10-21
Payer: MEDICARE

## 2024-10-21 ENCOUNTER — LAB (OUTPATIENT)
Dept: LAB | Facility: LAB | Age: 79
End: 2024-10-21
Payer: MEDICARE

## 2024-10-21 DIAGNOSIS — N30.01 ACUTE CYSTITIS WITH HEMATURIA: Primary | ICD-10-CM

## 2024-10-21 DIAGNOSIS — R31.0 GROSS HEMATURIA: Primary | ICD-10-CM

## 2024-10-21 DIAGNOSIS — R30.0 DYSURIA: ICD-10-CM

## 2024-10-21 DIAGNOSIS — R31.0 GROSS HEMATURIA: ICD-10-CM

## 2024-10-21 PROCEDURE — 81001 URINALYSIS AUTO W/SCOPE: CPT

## 2024-10-21 PROCEDURE — 87186 SC STD MICRODIL/AGAR DIL: CPT

## 2024-10-21 PROCEDURE — 87086 URINE CULTURE/COLONY COUNT: CPT

## 2024-10-21 RX ORDER — SULFAMETHOXAZOLE AND TRIMETHOPRIM 400; 80 MG/1; MG/1
1 TABLET ORAL 2 TIMES DAILY
Qty: 20 TABLET | Refills: 0 | Status: SHIPPED | OUTPATIENT
Start: 2024-10-21 | End: 2024-10-31

## 2024-10-21 NOTE — TELEPHONE ENCOUNTER
Talked with him and can not do Cipro with sotalol and will call in SS Bactrim for 10 day and if not better with chills and urgency  than will need to get CT scan to rule out renal stone

## 2024-10-21 NOTE — TELEPHONE ENCOUNTER
He has been struggling with a bladder infection, blood in urine, frequency, chills, can you put in an order for urinalysis and culture? And send in an antibiotic? He is very uncomfortable. He did take 1 pill of a z pack.

## 2024-10-22 ENCOUNTER — PATIENT MESSAGE (OUTPATIENT)
Dept: PRIMARY CARE | Facility: CLINIC | Age: 79
End: 2024-10-22
Payer: MEDICARE

## 2024-10-22 DIAGNOSIS — N39.0 ACUTE UTI: Primary | ICD-10-CM

## 2024-10-22 LAB
APPEARANCE UR: ABNORMAL
BILIRUB UR STRIP.AUTO-MCNC: NEGATIVE MG/DL
COLOR UR: YELLOW
GLUCOSE UR STRIP.AUTO-MCNC: NORMAL MG/DL
HOLD SPECIMEN: NORMAL
KETONES UR STRIP.AUTO-MCNC: NEGATIVE MG/DL
LEUKOCYTE ESTERASE UR QL STRIP.AUTO: ABNORMAL
NITRITE UR QL STRIP.AUTO: NEGATIVE
PH UR STRIP.AUTO: 5.5 [PH]
PROT UR STRIP.AUTO-MCNC: ABNORMAL MG/DL
RBC # UR STRIP.AUTO: ABNORMAL /UL
RBC #/AREA URNS AUTO: >20 /HPF
SP GR UR STRIP.AUTO: 1.02
SQUAMOUS #/AREA URNS AUTO: ABNORMAL /HPF
UROBILINOGEN UR STRIP.AUTO-MCNC: ABNORMAL MG/DL
WBC #/AREA URNS AUTO: >50 /HPF
WBC CLUMPS #/AREA URNS AUTO: ABNORMAL /HPF

## 2024-10-22 RX ORDER — SULFAMETHOXAZOLE AND TRIMETHOPRIM 800; 160 MG/1; MG/1
1 TABLET ORAL 2 TIMES DAILY
Qty: 14 TABLET | Refills: 0 | Status: SHIPPED | OUTPATIENT
Start: 2024-10-22 | End: 2024-10-29

## 2024-10-22 NOTE — RESULT ENCOUNTER NOTE
Fadi Logan-  So the urine looks pretty turbid and has lots of WBC and blood   Culture is pending   Please let me know how the bactrim is working and if feel some relief I thing I can call in the DS bactrim since your GFR is >50 as we discussed

## 2024-10-24 ENCOUNTER — CLINICAL SUPPORT (OUTPATIENT)
Dept: PRIMARY CARE | Facility: CLINIC | Age: 79
End: 2024-10-24
Payer: MEDICARE

## 2024-10-24 DIAGNOSIS — Z23 ENCOUNTER FOR IMMUNIZATION: Primary | ICD-10-CM

## 2024-10-24 LAB — BACTERIA UR CULT: ABNORMAL

## 2024-10-24 PROCEDURE — G0008 ADMIN INFLUENZA VIRUS VAC: HCPCS | Performed by: INTERNAL MEDICINE

## 2024-10-24 PROCEDURE — 90662 IIV NO PRSV INCREASED AG IM: CPT | Performed by: INTERNAL MEDICINE

## 2024-10-24 NOTE — RESULT ENCOUNTER NOTE
Hi- so great news the UTI is + ecoli and it is sensitive to bactrim and so take for 10 days the DS dosage

## 2024-10-24 NOTE — TELEPHONE ENCOUNTER
+UTI with Klebsiella and sensitive to bactrim and do DS for 10 day and pt feeling better   Will need work up why got a UTI

## 2024-12-02 DIAGNOSIS — N18.32 STAGE 3B CHRONIC KIDNEY DISEASE (MULTI): Primary | ICD-10-CM

## 2024-12-02 DIAGNOSIS — E78.2 MIXED HYPERLIPIDEMIA: ICD-10-CM

## 2024-12-02 DIAGNOSIS — I50.32 CHRONIC DIASTOLIC HEART FAILURE: ICD-10-CM

## 2024-12-02 DIAGNOSIS — E03.9 HYPOTHYROIDISM, UNSPECIFIED TYPE: ICD-10-CM

## 2024-12-02 DIAGNOSIS — E11.9 CONTROLLED TYPE 2 DIABETES MELLITUS WITHOUT COMPLICATION, WITHOUT LONG-TERM CURRENT USE OF INSULIN (MULTI): ICD-10-CM

## 2024-12-02 DIAGNOSIS — I48.92 ATRIAL FLUTTER, UNSPECIFIED TYPE (MULTI): ICD-10-CM

## 2024-12-02 DIAGNOSIS — N30.00 ACUTE CYSTITIS WITHOUT HEMATURIA: ICD-10-CM

## 2024-12-02 DIAGNOSIS — E83.52 HYPERCALCEMIA: ICD-10-CM

## 2024-12-03 ENCOUNTER — APPOINTMENT (OUTPATIENT)
Dept: PRIMARY CARE | Facility: CLINIC | Age: 79
End: 2024-12-03
Payer: MEDICARE

## 2024-12-10 LAB — HEMOGLOBIN A1C/HEMOGLOBIN TOTAL IN BLOOD: 6.5 %

## 2025-01-08 ENCOUNTER — TELEPHONE (OUTPATIENT)
Dept: PRIMARY CARE | Facility: CLINIC | Age: 80
End: 2025-01-08

## 2025-01-08 ENCOUNTER — APPOINTMENT (OUTPATIENT)
Dept: PRIMARY CARE | Facility: CLINIC | Age: 80
End: 2025-01-08
Payer: MEDICARE

## 2025-01-08 VITALS
HEART RATE: 58 BPM | WEIGHT: 232 LBS | TEMPERATURE: 98 F | OXYGEN SATURATION: 97 % | SYSTOLIC BLOOD PRESSURE: 120 MMHG | HEIGHT: 68 IN | DIASTOLIC BLOOD PRESSURE: 60 MMHG | BODY MASS INDEX: 35.16 KG/M2

## 2025-01-08 DIAGNOSIS — I50.42 CHRONIC COMBINED SYSTOLIC AND DIASTOLIC HEART FAILURE: ICD-10-CM

## 2025-01-08 DIAGNOSIS — E78.2 MIXED HYPERLIPIDEMIA: ICD-10-CM

## 2025-01-08 DIAGNOSIS — K22.70 BARRETT'S ESOPHAGUS WITHOUT DYSPLASIA: ICD-10-CM

## 2025-01-08 DIAGNOSIS — J01.00 ACUTE NON-RECURRENT MAXILLARY SINUSITIS: ICD-10-CM

## 2025-01-08 DIAGNOSIS — E66.01 OBESITY, MORBID (MULTI): ICD-10-CM

## 2025-01-08 DIAGNOSIS — E03.9 HYPOTHYROIDISM, UNSPECIFIED TYPE: ICD-10-CM

## 2025-01-08 DIAGNOSIS — E11.9 CONTROLLED TYPE 2 DIABETES MELLITUS WITHOUT COMPLICATION, WITHOUT LONG-TERM CURRENT USE OF INSULIN (MULTI): ICD-10-CM

## 2025-01-08 DIAGNOSIS — N18.32 STAGE 3B CHRONIC KIDNEY DISEASE (MULTI): ICD-10-CM

## 2025-01-08 DIAGNOSIS — I10 BENIGN ESSENTIAL HYPERTENSION: ICD-10-CM

## 2025-01-08 DIAGNOSIS — I48.3 TYPICAL ATRIAL FLUTTER (MULTI): ICD-10-CM

## 2025-01-08 DIAGNOSIS — E11.649 HYPOGLYCEMIC EPISODE IN PATIENT WITH DIABETES MELLITUS (MULTI): Primary | ICD-10-CM

## 2025-01-08 PROCEDURE — G2211 COMPLEX E/M VISIT ADD ON: HCPCS | Performed by: INTERNAL MEDICINE

## 2025-01-08 PROCEDURE — 1123F ACP DISCUSS/DSCN MKR DOCD: CPT | Performed by: INTERNAL MEDICINE

## 2025-01-08 PROCEDURE — 3074F SYST BP LT 130 MM HG: CPT | Performed by: INTERNAL MEDICINE

## 2025-01-08 PROCEDURE — 1160F RVW MEDS BY RX/DR IN RCRD: CPT | Performed by: INTERNAL MEDICINE

## 2025-01-08 PROCEDURE — 1159F MED LIST DOCD IN RCRD: CPT | Performed by: INTERNAL MEDICINE

## 2025-01-08 PROCEDURE — 1036F TOBACCO NON-USER: CPT | Performed by: INTERNAL MEDICINE

## 2025-01-08 PROCEDURE — 1157F ADVNC CARE PLAN IN RCRD: CPT | Performed by: INTERNAL MEDICINE

## 2025-01-08 PROCEDURE — 99215 OFFICE O/P EST HI 40 MIN: CPT | Performed by: INTERNAL MEDICINE

## 2025-01-08 PROCEDURE — 3078F DIAST BP <80 MM HG: CPT | Performed by: INTERNAL MEDICINE

## 2025-01-08 RX ORDER — DAPAGLIFLOZIN 5 MG/1
5 TABLET, FILM COATED ORAL DAILY
Qty: 30 TABLET | Refills: 1 | Status: SHIPPED | OUTPATIENT
Start: 2025-01-08 | End: 2025-03-09

## 2025-01-08 RX ORDER — AMOXICILLIN AND CLAVULANATE POTASSIUM 875; 125 MG/1; MG/1
875 TABLET, FILM COATED ORAL 2 TIMES DAILY
Qty: 20 TABLET | Refills: 0 | Status: SHIPPED | OUTPATIENT
Start: 2025-01-08 | End: 2025-01-18

## 2025-01-08 RX ORDER — BENZONATATE 200 MG/1
200 CAPSULE ORAL 3 TIMES DAILY PRN
Qty: 42 CAPSULE | Refills: 0 | Status: SHIPPED | OUTPATIENT
Start: 2025-01-08 | End: 2025-02-07

## 2025-01-08 RX ORDER — BISOPROLOL FUMARATE 10 MG/1
10 TABLET, FILM COATED ORAL 2 TIMES DAILY
COMMUNITY
Start: 2024-11-11

## 2025-01-08 ASSESSMENT — PATIENT HEALTH QUESTIONNAIRE - PHQ9
SUM OF ALL RESPONSES TO PHQ9 QUESTIONS 1 AND 2: 0
1. LITTLE INTEREST OR PLEASURE IN DOING THINGS: NOT AT ALL
1. LITTLE INTEREST OR PLEASURE IN DOING THINGS: NOT AT ALL
SUM OF ALL RESPONSES TO PHQ9 QUESTIONS 1 AND 2: 0
2. FEELING DOWN, DEPRESSED OR HOPELESS: NOT AT ALL
2. FEELING DOWN, DEPRESSED OR HOPELESS: NOT AT ALL

## 2025-01-08 NOTE — PROGRESS NOTES
"Subjective   Patient ID: Chito Baxter is a 79 y.o. male who presents for his 6 month follow up multiple medical conditions and evaluation for UR Sx         He complains of UR Sx   He has a cough. He tested negative for COVID  He has Tessalon Perles to use prn  His colds the past few years start in his sinuses then turn into cough and chest congestion  He complains of persistent PND. He was having left sided pain when coughing.  He has been sick approx 1.5 weeks (approx onset 12/24    He complains of struggling with insomnia     He has a fear about his a- fib and his medical issues   He states he has become \"fearful\"   His wife states she has noticed changes in her husbands thought process since beginning the new medication from cardiology     He had a hypoglycemic episode on 11/1/24  He was building something with his daughter   He was tired at the end of the day so he went to bed.   He sat on the side of the bed for 10- 15 minutes mumbling and not making sense  He admits he had not had anything to eat.  He woke up and his FBG was 40. He has started cutting his evening dose of glipizide in 1/2.  He feels he is eating more than he ever has trying to keep up his caloric intake   The next evening he ate a late dinner and while at the dinner table he had another hypoglycemic attack.      He is drinking 2 16 ounce glasses of fluid daily along with a couple cups of coffee and other fluids throughout        HEALTH :  PSA 4/15, 4/19, 6/20 and no longer needs to repeat   Colon 3/12, 10/17 adenoma, 10/20 and Q 5  EKG 2022, 1/23, 3/23, 11/23, 10/24 cardio  Spot protein 6/20, 6/21, 6/22, 11/23, 12/24   Hep A 8/1/2023  Hep C 1/18 -  HD flu 9/20, 9/21, 9/22, 10/23, 10/24   TDAP 2006, 12/17/18, 9/28/2022  RSV completed 2023  Pneumovax 2011   Prevnar 13 12/14/17  Shingrix 5/18 and 8/18  Moderna CVD 2/21 and 3/21 booster 11/21, 3/22, 9/22, 6/23, 9/23, 7/24   Oph He saw Dr Mathews in 6/2024. He has borderline glaucoma. He had " bilateral cataract extraction with IOL placement both eyes 1/2024 with Dr Mathews. No MD or DR   Copy of his Advanced Directives scanned in his chart 6/2022        Review of Systems  All systems negative except those listed in the HPI       Objective   Visit Vitals  /60 (BP Location: Left arm, Patient Position: Sitting, BP Cuff Size: Adult)   Pulse 58   Temp 36.7 °C (98 °F) (Temporal)    Body mass index is 35.28 kg/m².     Physical Exam  Vitals reviewed. Exam conducted with a chaperone present (wife).   Constitutional:       Appearance: He is obese.   HENT:      Head: Normocephalic.      Right Ear: Tympanic membrane, ear canal and external ear normal.      Left Ear: Tympanic membrane, ear canal and external ear normal.      Nose: Nose normal.      Mouth/Throat:      Pharynx: Oropharynx is clear.   Eyes:      Conjunctiva/sclera: Conjunctivae normal.   Cardiovascular:      Rate and Rhythm: Normal rate and regular rhythm.      Pulses: Normal pulses.      Heart sounds: Normal heart sounds.      Comments: A- fib, good rate   Pulmonary:      Effort: Pulmonary effort is normal.      Comments: Bronchial rhonchi noted   Abdominal:      General: Bowel sounds are normal.      Palpations: Abdomen is soft.   Musculoskeletal:         General: Normal range of motion.      Cervical back: Normal range of motion and neck supple.   Skin:     General: Skin is warm.   Neurological:      General: No focal deficit present.      Mental Status: He is alert and oriented to person, place, and time.      Comments: CN II- XII intact    Psychiatric:         Mood and Affect: Mood normal.         Behavior: Behavior normal.         Thought Content: Thought content normal.         Judgment: Judgment normal.       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    Relevant Medications    bisoprolol (Zebeta) 10 mg tablet    Controlled type 2 diabetes mellitus without complication, without long-term current use of insulin (Multi)    Hyperlipidemia    Hypothyroidism    Stage 3b chronic kidney disease (Multi)     Other Visit Diagnoses       Hypoglycemic episode in patient with diabetes mellitus (Multi)    -  Primary    Acute non-recurrent maxillary sinusitis                Follow up completed  Reviewed his labs from 12/24 - see lab results under the Media tab in EMR   Pro BNP 1,899  HbA1c 6.5 %  Uric acid 7.4  TSH 2.76  LDL 29  Recommend he get in 2 L of water a day       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   He works at [employer] on an as needed basis   He is working 4-8 shifts a month with 6 hour shifts and he is happy with this.  They are building a new house     Acute sinusitis: On exam: bronchial rhonchi noted 1/25.   He complains of UR Sx. He has a cough. He tested negative for COVID  He has Tessalon Perles to use prn  His colds the past few years start in his sinuses then turn into cough and chest congestion. He complains of persistent PND.   He was having left sided pain when coughing.  He has been sick approx 1.5 weeks (approx onset 12/24   Refilled Tessalon Perles 200 mg to use up to TID   Prescription sent in for Augmentin 875 mg BID for 14 days, possible side effects discussed with medication   Encouraged rest and increased hydration with warm tepid/ fluids  Recommend humidifier use at night during winter months (Oct- Mar)   He will call if Sx persist or worsen      He has no issues with his hearing to report   He had his hearing tested in the past and found to have high frequency loss      His weight in office is 232 with BMI of 35.28. We spent 15 minutes discussing diet and weight loss. The struggle of weight loss persists    He has been shopping in the \"[employer]\" section " "since childhood   He is not exercising routinely      HTN: BP Stable.   Continue lisinopril 40 mg BID, HCTZ 25 mg daily and amlodipine 5 mg BID.   Stress test in 1/11 was normal  PVD studies in 2/12 were normal.  ECG: 10/24 afib @ 73 nonspecific ST -T wave abnormalities   Recommend he monitor his sodium intake   Recommend he monitor his BP at home and call with elevated readings   He saw Dr Mckeon in 12/24      A-Fib: Pro BNP 1,899 on labs in 12/24. On exam: CN II- XII intact, he is in a- fib 1/25. We discussed his labs values in detail 1/25. He has a fear about his a- fib and his medical issues. He states he has become \"fearful\" and having dread about his medical issues. Explained the betablocker may be contributing to his increased fears. His wife states she has noticed changes in her husbands thought process since beginning bisoprolol. He is in the process of building a house and he feels inadequate with making decisions and he is too fatigued most of the time. I think he needs to talk with Dr Mckeon and let her know that the patient has noticed mental changes since beginning bisoprolol and see what medication she would like to change it to. He started the medication in 11/24 and his Sx began shortly thereafter. He is to make Dr Mckeon aware I am adding Farxiga and he is going to discontinue glipizide. His BNP is elevated now and I would like him to ask Dr Mckeon if she would like another ECHO.   He is monitoring his EKG on the [facility].     Dx him with atrial fib on appt in 12/16. S/p unsuccessful DCCV 1-6-17.    ECHO was normal, cardioversion failed x 3.   Continue bisoprolol 10 mg daily and Eliquis 5 mg daily.  Cardiology discontinued Sotalol   Continue monitoring BP at home and call with elevated readings   He saw Dr Mckeon in 12/24      I spent 15 minutes face to face discussing his cardiac risks.   We discussed the patients cardiovascular risk. If needed, lifestyle modifications recommended including: " behavioral therapies of nutrition choices, exercise and eliminate habits that are contributing to their cardiac risk. We agreed to a plan to decrease his cardiovascular risks. Discussed ASA. Reviewed Guidelines and approved recommendations made to patient.   The patient's 10 yr CV risk was estimated at : ACO score 4/6 IO 1/25       HLD: LDL 29 on labs in 12/2024   I would like to see his HDL between 55- 60. Increase exercise   He stopped Lipitor due to liver functions on labs in 11/2023.  Ca cardiac score 5/2021 was 339 in LAD and atelectasis noted only   Recommend he look into a plant based/ whole foods diet      Hypothyroid: TSH 2.76 on labs in 12/24   Continue levothyroxine 50 mcg daily.     DM: HbA1c was 6.5 % on labs in 12/2024. He had a hypoglycemic episode on 11/1/24  He was building something with his daughter. He was tired at the end of the day so he went to bed. He sat on the side of the bed for 10- 15 minutes mumbling and not making sense. He admits he had not had anything to eat. He woke up and his FBG was 40. He has started cutting his evening dose of glipizide in 1/2. He feels he is eating more than he ever has trying to keep up his caloric intake. The next evening he ate a late dinner and while at the dinner table he had another hypoglycemic attack. We need to get him off glipizide. Explained glipizide will keep him in hypoglycemic state for 36 hours. Recommend he discontinue glipizide. Prescription sent in for Farxiga 5 mg daily, possible side effects discussed with medication. Recommend he check to see if his insurance will cover Mounjaro. I think he would be a good candidate for Mounjaro with his diabetes and heart disease.   Diabetic Composite Score 5/5 IO 1/25    Continue metformin 1000 mg BID    He exercises daily and wears support stockings when he travels.   Spot protein was elevated on labs in 12/24   Recommend he monitor his feet nightly for skin breakdown and ulcerations   Recommend he look  into a plant based/ whole foods diet      Renal insuff: Cr 1.51 on labs in 12/24   We will continue to monitor  RUQ US 11/2023 was normal   He saw Dr Roberts 12/23     BLE edema:    Recommend he wear support stockings daily   Recommend he monitor his daily sodium intake   Recommend he elevate his legs for 45 minutes at night    Offered the patient a diuretic, he declines 6/24     Guerrero's esophagus:  Continue Protonix 40 mg daily    ERCP 1/2024 The major papilla showed evidence of a previous biliary sphincterotomy, which was noted to be stenosed. The common bile duct was cannulated using an extraction balloon with guidewire. Contrast was injected. Multiple sweeps were performed in the common bile duct using a balloon. Sludge and debris were removed, achieving complete clearance. Completion/extension biliary sphincterotomy was performed using a sphincterotome. Sweeping was again performed in the common bile duct. No resistance to balloon sweep at conclusion.    EGD 1/2024 Irregular Z-line 43 cm from the incisors Bx -No intestinal metaplasia, dysplasia or malignancy is identified    He saw Dr Roberts 12/23      Seasonal allergies:  Continue loratadine 10 mg prn use      -S/p lap cholecystectomy and #15 Hungarian drain was placed in the Morison's pouch 1/25/2023, s/p ERCP and sphincterotomy  He saw Dr Roberts in 12/23     Renal cyst:  The kidney cyst takes up 20% of volume.   He saw Dr Roberts 12/23       Diarrhea:   He has semi-liquid soft stools and rectal leakage with exertion.   Recommend Metamucil daily.      Balance issues:  He declines balance therapy.  Recommend he try [facility], encouraged him to modify his activity levels (not going up ladders, etc.     Venous insufficiency: he has varicose veins and chronic scarring from venous insufficiency LE bilaterally.  Continue wearing support stockings with travel.  He will be careful when he first gets up due to venous pooling.   Norvasc can contribute to this.      Skin  issues:  He has hyperkeratosis on the auricles of his ears and seeing dermatology  He uses creams and sometimes derm freezes the area    He saw Dr Lozano and had some laser to the face 12/19  He used 5FU cream as prescribed by dermatology in 2021  He saw derm and had Mohs of BCC on the side of his nose 5/2024      Vitamin D deficiency: Vitamin D 66 on labs in 12/2021   Continue OTC Vitamin D 2000 units daily.      Uric Acid levels in 7.4 on labs in 12/24  Continue allopurinol 100 mg daily     PSA normal in 6/2020 and no longer needs to repeat    Colonoscopy in 10/2020 and Q 5.      Ophth:    He saw Dr Mathews in 6/2024. He has borderline glaucoma. He had bilateral cataract extraction with IOL placement both eyes 1/2024 with Dr Mathews. No MD or DR      His wife is his medical POA and then his daughters. He has a living will.   Copy of his Advanced Directives scanned in his chart 6/2022     Hep A 8/1/2023  Hep C 1/18 -  HD flu 9/20, 9/21, 9/22, 10/23, 10/24   TDAP 2006, 12/17/18, 9/28/2022  RSV completed 2023  Pneumovax 2011   Prevnar 13 12/14/17  Shingrix 5/18 and 8/18  Moderna CVD 2/21 and 3/21 booster 11/21, 3/22, 9/22, 6/23, 9/23, 7/24       Some elements in the chart were copied from Dr. Perez's last office visit with patient. Notes have been updated where appropriate, and reflect my current medical decision making from today.      RTC in 1 month for follow up or sooner if needed   (MCR due 6/2025, last mcr 6/4/2024)     Scribe Attestation  By signing my name below, I, Viky Hahn , Scribe   attest that this documentation has been prepared under the direction and in the presence of Chichi Perez MD.

## 2025-02-03 DIAGNOSIS — E11.9 CONTROLLED TYPE 2 DIABETES MELLITUS WITHOUT COMPLICATION, WITHOUT LONG-TERM CURRENT USE OF INSULIN (MULTI): ICD-10-CM

## 2025-02-03 DIAGNOSIS — N18.32 STAGE 3B CHRONIC KIDNEY DISEASE (MULTI): ICD-10-CM

## 2025-02-03 DIAGNOSIS — E11.9 CONTROLLED TYPE 2 DIABETES MELLITUS WITHOUT COMPLICATION, WITHOUT LONG-TERM CURRENT USE OF INSULIN (MULTI): Primary | ICD-10-CM

## 2025-02-03 RX ORDER — DAPAGLIFLOZIN 10 MG/1
10 TABLET, FILM COATED ORAL DAILY
Qty: 90 TABLET | Refills: 3 | Status: SHIPPED | OUTPATIENT
Start: 2025-02-03 | End: 2026-02-03

## 2025-02-03 RX ORDER — DAPAGLIFLOZIN 10 MG/1
10 TABLET, FILM COATED ORAL DAILY
Qty: 90 TABLET | Refills: 3 | Status: SHIPPED | OUTPATIENT
Start: 2025-02-03 | End: 2025-02-03 | Stop reason: SDUPTHER

## 2025-02-10 NOTE — PROGRESS NOTES
Subjective   Patient ID: Chito Baxter is a 79 y.o. male who presents for 1 month follow up multiple medical conditions      He feels better mentally and physically since the change in his BP medications   His BP readings at home have been in normal range.  He has not had a lot to drink this morning but he is trying     FBG this morning was 133.  Since beginning the higher dose of Farxiga his readings really have not lowered   He has intentional weight loss   He restarted glipizide 2.5 mg daily and it helps his FBG.      HEALTH :  PSA 4/15, 4/19, 6/20 and ordered 2/25  Colon 3/12, 10/17 adenoma, 10/20 and Q 5  Ca cardiac score 5/21 was 339 in LAD and atelectasis noted only   EKG 2022, 1/23, 3/23, 11/23, 10/24 cardio  Spot protein 6/20, 6/21, 6/22, 11/23, 12/24   Hep A 8/1/2023  Hep C 1/18 -  HD flu 9/20, 9/21, 9/22, 10/23, 10/24   TDAP 2006, 12/18, 9/28/2022  RSV completed 2023  Pneumovax 2011   Prevnar 13 12/14/17  Shingrix 5/18 and 8/18  Moderna CVD 2/21, 3/21, 11/21, 3/22, 9/22, 6/23, 9/23, 7/24, 12/24   Ophth He saw Dr Mathews in 6/2024. He has borderline glaucoma. He had bilateral cataract extraction with IOL placement both eyes 1/2024 with Dr Mathews. No MD or DR   Copy of his Advanced Directives scanned in his chart 6/2022        Review of Systems  All systems negative except those listed in the HPI      Objective   Visit Vitals  BP 90/60 (BP Location: Left arm, Patient Position: Sitting, BP Cuff Size: Adult)   Pulse 76    Body mass index is 34.82 kg/m².      Physical Exam  Vitals reviewed.   Constitutional:       Appearance: Normal appearance. He is obese.   HENT:      Head: Normocephalic.      Right Ear: Tympanic membrane, ear canal and external ear normal.      Left Ear: Tympanic membrane, ear canal and external ear normal.      Nose: Nose normal.      Mouth/Throat:      Pharynx: Oropharynx is clear.   Eyes:      Conjunctiva/sclera: Conjunctivae normal.   Cardiovascular:      Rate and Rhythm: Normal  rate. Rhythm irregular.      Pulses: Normal pulses.      Heart sounds: Normal heart sounds.      Comments: A- fib, rate controlled   +1 edema RLE and trace edema LLE,  he has varicose veins and chronic scarring from venous insufficiency LE bilaterally.  Pulmonary:      Effort: Pulmonary effort is normal.      Breath sounds: Normal breath sounds.   Abdominal:      General: Bowel sounds are normal.      Palpations: Abdomen is soft.   Musculoskeletal:         General: Normal range of motion.      Cervical back: Normal range of motion and neck supple.   Skin:     General: Skin is warm.   Neurological:      General: No focal deficit present.      Mental Status: He is alert and oriented to person, place, and time.   Psychiatric:         Mood and Affect: Mood normal.         Behavior: Behavior normal.         Thought Content: Thought content normal.         Judgment: Judgment normal.       Assessment/Plan   Problem List Items Addressed This Visit       Atrial flutter (Multi)    Relevant Medications    nebivolol (Bystolic) 10 mg tablet    Other Relevant Orders    Magnesium    Chronic combined systolic and diastolic heart failure    Relevant Medications    nebivolol (Bystolic) 10 mg tablet    Other Relevant Orders    B-type natriuretic peptide    Controlled type 2 diabetes mellitus without complication, without long-term current use of insulin (Multi) - Primary    Relevant Orders    CBC and Auto Differential    Comprehensive Metabolic Panel    Hemoglobin A1C    Albumin-Creatinine Ratio, Urine Random    Hyperlipidemia    Relevant Orders    Lipid Panel    Hypothyroidism    Relevant Orders    TSH with reflex to Free T4 if abnormal    Stage 3b chronic kidney disease (Multi)     Other Visit Diagnoses       Prostate cancer screening        Relevant Orders    Prostate Spec.Ag,Screen                Follow up completed  Reviewed his labs from 12/24   Labs ordered     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   He works at [employer] on an as needed basis   He is working 4-8 shifts a month with 6 hour shifts and he is happy with this.  They are building a new house      He has no issues with his hearing to report   He had his hearing tested in the past and found to have high frequency loss      His weight in office is 224 with BMI of 34.06. We spent 15 minutes discussing diet and weight loss. The struggle of weight loss persists    I would like to see his BMI below 30.   He has been shopping in the \"[employer]\" section since childhood   Recommend he look into a plant based/ whole foods diet   He has lost 8 pounds since last visit. Encouraged continued weight loss      HTN: BP Stable. He feels better mentally and physically since the change in his BP medications   Continue lisinopril 40 mg BID, HCTZ 25 mg daily and amlodipine 5 mg BID.   Continue Bystolic 10 mg BID.   Stress test in 1/11 was normal  PVD studies in 2/12 were normal.  ECG: 10/24 afib @ 73 nonspecific ST -T wave abnormalities   Recommend he monitor his sodium intake   Recommend he monitor his BP at home and call with elevated readings   He saw Dr Mckeon in 12/24      A-Fib: Pro BNP 1,899 on labs in 12/24. On exam: a- fib, rate controlled 2/25. Labs ordered and we will adjust if indicated  2/25  He is monitoring his EKG on the Q-Bot.     Dx him with atrial fib on appt in 12/16. S/p unsuccessful DCCV 1-6-17.    ECHO was normal, cardioversion failed x 3.   Continue Eliquis 5 mg daily and Bystolic 10 mg BID.   Cardiology discontinued Sotalol   He could not tolerate bisoprolol    Continue monitoring BP at home and call with elevated readings   He saw Dr Mckeon in 12/24      I spent 15 minutes face to face discussing his cardiac risks.   We discussed the patients cardiovascular risk. If needed, lifestyle modifications recommended including: behavioral therapies of nutrition choices, exercise and eliminate habits that are " contributing to their cardiac risk. We agreed to a plan to decrease his cardiovascular risks. Discussed ASA. Reviewed Guidelines and approved recommendations made to patient.   The patient's 10 yr CV risk was estimated at : ACO score 4/6 IO 2/25       HLD: LDL 29 on labs in 12/2024. Labs ordered and we will adjust if indicated  2/25  I would like to see his HDL between 55- 60. Increase exercise   Continue atorvastatin 10 mg daily   Ca cardiac score 5/2021 was 339 in LAD and atelectasis noted only   Recommend he look into a plant based/ whole foods diet      Hypothyroid: TSH 2.76 on labs in 12/24. Labs ordered and we will adjust if indicated  2/25  Continue levothyroxine 50 mcg daily.     DM: HbA1c was 6.5 % on labs in 12/2024. Labs ordered and we will adjust if indicated  2/25. FBG this morning was 133. Reviewing his BS monitor his average FBG is 150. Since beginning the higher dose of Farxiga his readings really have not lowered. He has intentional weight loss. Recommend he check his BG levels after exercising. Recommend 10 minutes of activity prior to eating. Recommend he consider adding one of the GLP 1 medications, discussed the risks and benefits of the GLP 1 medications. We will readdress this conversation at a later date   Diabetic Composite Score 5/5 IO 2/25   Continue metformin 1000 mg BID and Farxiga 10 mg daily   Restart glipizide 2.5 mg BID.    He exercises daily and wears support stockings when he travels.   Spot protein was elevated on labs in 12/24   Recommend he monitor his feet nightly for skin breakdown and ulcerations   Recommend he look into a plant based/ whole foods diet      Renal insuff: Cr 1.51 on labs in 12/24. Labs ordered and we will adjust if indicated  2/25  We will continue to monitor  RUQ US 11/2023 was normal   He saw Dr Roberts 12/23     Guerrero's esophagus:  Continue pantoprazole 40 mg daily    ERCP 1/2024 The major papilla showed evidence of a previous biliary sphincterotomy, which  was noted to be stenosed. The common bile duct was cannulated using an extraction balloon with guidewire. Contrast was injected. Multiple sweeps were performed in the common bile duct using a balloon. Sludge and debris were removed, achieving complete clearance. Completion/extension biliary sphincterotomy was performed using a sphincterotome. Sweeping was again performed in the common bile duct. No resistance to balloon sweep at conclusion.    EGD 1/2024 Irregular Z-line 43 cm from the incisors Bx -No intestinal metaplasia, dysplasia or malignancy is identified    He saw Dr Roberts 12/23      Seasonal allergies:  Continue loratadine 10 mg prn use     BLE edema/ bilateral venous insufficiency >Rt: On exam: +1 edema RLE and trace edema, he has varicose veins and chronic scarring from venous insufficiency LE bilaterally. LLE 2/25.   Recommend he wear support stockings daily   Recommend he monitor his daily sodium intake   Recommend he elevate his legs for 45 minutes at night    Offered the patient a diuretic, he declines 6/24      -S/p lap cholecystectomy and #15 Tunisian drain was placed in the Morison's pouch 1/25/2023, s/p ERCP and sphincterotomy  He saw Dr Roberts in 12/23     Renal cyst:  The kidney cyst takes up 20% of volume.   He saw Dr Roberts 12/23       Diarrhea:   He has semi-liquid soft stools and rectal leakage with exertion.   Recommend Metamucil daily.      Balance issues:  He declines balance therapy.  Recommend he try Pa Chi, encouraged him to modify his activity levels (not going up ladders, etc.     Skin issues:  He has hyperkeratosis on the auricles of his ears and seeing dermatology  He uses creams and sometimes derm freezes the area    He saw Dr Lozano and had some laser to the face 12/19  He used 5FU cream as prescribed by dermatology in 2021  He saw derm and had Mohs of BCC on the side of his nose 5/2024      Vitamin D deficiency: Vitamin D 66 on labs in 12/2021   Continue OTC Vitamin D 2000  units daily.      Uric Acid levels in 7.4 on labs in 12/24  Continue allopurinol 100 mg daily     PSA normal in 6/2020 and ordered 2/25  Colonoscopy in 10/2020 and Q 5.      Ophth:    He saw Dr Mathews in 6/2024. He has borderline glaucoma. He had bilateral cataract extraction with IOL placement both eyes 1/2024 with Dr Mathews. No MD or DR      His wife is his medical POA and then his daughters. He has a living will.   Copy of his Advanced Directives scanned in his chart 6/2022     Hep A 8/1/2023  Hep C 1/18 -  HD flu 9/20, 9/21, 9/22, 10/23, 10/24   TDAP 2006, 12/18, 9/28/2022   RSV completed 2023  Pneumovax 2011   Prevnar 13 12/14/17  Shingrix 5/18 and 8/18  Moderna CVD 2/21, 3/21, 11/21, 3/22, 9/22, 6/23, 9/23, 7/24, 12/24      Some elements in the chart were copied from Dr. Perez's last office visit with patient. Notes have been updated where appropriate, and reflect my current medical decision making from today.      RTC in 6/25 for MCR or sooner if needed   (MCR due 6/2025, last mcr 6/4/2024)     Scribe Attestation  By signing my name below, I, Viky Jovani , Scribe   attest that this documentation has been prepared under the direction and in the presence of Chichi Perez MD.

## 2025-02-11 ENCOUNTER — APPOINTMENT (OUTPATIENT)
Dept: PRIMARY CARE | Facility: CLINIC | Age: 80
End: 2025-02-11
Payer: MEDICARE

## 2025-02-11 VITALS
DIASTOLIC BLOOD PRESSURE: 60 MMHG | HEART RATE: 76 BPM | HEIGHT: 68 IN | WEIGHT: 224 LBS | BODY MASS INDEX: 33.95 KG/M2 | SYSTOLIC BLOOD PRESSURE: 90 MMHG | OXYGEN SATURATION: 98 %

## 2025-02-11 DIAGNOSIS — R60.0 LOCALIZED EDEMA: ICD-10-CM

## 2025-02-11 DIAGNOSIS — E03.9 HYPOTHYROIDISM, UNSPECIFIED TYPE: ICD-10-CM

## 2025-02-11 DIAGNOSIS — I50.42 CHRONIC COMBINED SYSTOLIC AND DIASTOLIC HEART FAILURE: ICD-10-CM

## 2025-02-11 DIAGNOSIS — Z12.5 PROSTATE CANCER SCREENING: ICD-10-CM

## 2025-02-11 DIAGNOSIS — E11.9 CONTROLLED TYPE 2 DIABETES MELLITUS WITHOUT COMPLICATION, WITHOUT LONG-TERM CURRENT USE OF INSULIN (MULTI): Primary | ICD-10-CM

## 2025-02-11 DIAGNOSIS — K22.70 BARRETT'S ESOPHAGUS WITHOUT DYSPLASIA: ICD-10-CM

## 2025-02-11 DIAGNOSIS — N18.32 STAGE 3B CHRONIC KIDNEY DISEASE (MULTI): ICD-10-CM

## 2025-02-11 DIAGNOSIS — I48.92 ATRIAL FLUTTER, UNSPECIFIED TYPE (MULTI): ICD-10-CM

## 2025-02-11 DIAGNOSIS — E78.2 MIXED HYPERLIPIDEMIA: ICD-10-CM

## 2025-02-11 PROBLEM — K81.9 CHOLECYSTITIS: Status: RESOLVED | Noted: 2023-01-27 | Resolved: 2025-02-11

## 2025-02-11 PROBLEM — R74.01 ELEVATION OF LEVELS OF LIVER TRANSAMINASE LEVELS: Status: RESOLVED | Noted: 2023-01-27 | Resolved: 2025-02-11

## 2025-02-11 PROBLEM — E66.01 OBESITY, MORBID (MULTI): Status: RESOLVED | Noted: 2023-01-31 | Resolved: 2025-02-11

## 2025-02-11 PROCEDURE — 3074F SYST BP LT 130 MM HG: CPT | Performed by: INTERNAL MEDICINE

## 2025-02-11 PROCEDURE — 3078F DIAST BP <80 MM HG: CPT | Performed by: INTERNAL MEDICINE

## 2025-02-11 PROCEDURE — 1160F RVW MEDS BY RX/DR IN RCRD: CPT | Performed by: INTERNAL MEDICINE

## 2025-02-11 PROCEDURE — 1123F ACP DISCUSS/DSCN MKR DOCD: CPT | Performed by: INTERNAL MEDICINE

## 2025-02-11 PROCEDURE — 1159F MED LIST DOCD IN RCRD: CPT | Performed by: INTERNAL MEDICINE

## 2025-02-11 PROCEDURE — 99214 OFFICE O/P EST MOD 30 MIN: CPT | Performed by: INTERNAL MEDICINE

## 2025-02-11 PROCEDURE — 1036F TOBACCO NON-USER: CPT | Performed by: INTERNAL MEDICINE

## 2025-02-11 PROCEDURE — G2211 COMPLEX E/M VISIT ADD ON: HCPCS | Performed by: INTERNAL MEDICINE

## 2025-02-11 PROCEDURE — 1157F ADVNC CARE PLAN IN RCRD: CPT | Performed by: INTERNAL MEDICINE

## 2025-02-11 RX ORDER — NEBIVOLOL 10 MG/1
10 TABLET ORAL 2 TIMES DAILY
COMMUNITY
Start: 2025-02-05

## 2025-02-11 RX ORDER — GLIPIZIDE 5 MG/1
2.5 TABLET ORAL
COMMUNITY

## 2025-02-11 ASSESSMENT — PATIENT HEALTH QUESTIONNAIRE - PHQ9
1. LITTLE INTEREST OR PLEASURE IN DOING THINGS: NOT AT ALL
2. FEELING DOWN, DEPRESSED OR HOPELESS: NOT AT ALL
SUM OF ALL RESPONSES TO PHQ9 QUESTIONS 1 AND 2: 0

## 2025-03-07 DIAGNOSIS — H34.03: ICD-10-CM

## 2025-03-07 DIAGNOSIS — H34.9 RETINAL ARTERY OCCLUSION: Primary | ICD-10-CM

## 2025-04-14 ENCOUNTER — PATIENT OUTREACH (OUTPATIENT)
Dept: PRIMARY CARE | Facility: CLINIC | Age: 80
End: 2025-04-14
Payer: MEDICARE

## 2025-04-14 NOTE — PROGRESS NOTES
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 He will contact providers if any concerns.Has stack and will go to urology tomorrow to have removed.) (4/14/2025 12:21 PM)    Engagement  Call Start Time: 1210 (4/14/2025 12:21 PM)    Medications  Medications reviewed with patient/caregiver?: Yes (4/14/2025 12:21 PM)  Is the patient having any side effects they believe may be caused by any medication additions or changes?: No (4/14/2025 12:21 PM)  Does the patient have all medications ordered at discharge?: Yes (4/14/2025 12:21 PM)  Care Management Interventions: No intervention needed (4/14/2025 12:21 PM)  Prescription Comments: -- (* Specific Medication Changes: Hold Lisinopril, HCTZ and Nibivolol until follow up with PCP ; Holding anticoagulation for Afib for 5 days post-operatively given bleeding risk after carotid endarterectomy) (4/14/2025 12:21 PM)  Is the patient taking all medications as directed (includes completed medication regime)?: Yes (4/14/2025 12:21 PM)  Medication Comments: -- (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, understands medications) (4/14/2025 12:21 PM)    Appointments  Does the patient have a primary care provider?: Yes (4/14/2025 12:21 PM)  Care Management Interventions: Verified appointment date/time/provider (4/16/2025) (4/14/2025 12:21 PM)  Has the patient kept scheduled appointments due by today?: Yes (4/14/2025 12:21 PM)    Self Management  What is the home health agency?: --  (na) (4/14/2025 12:21 PM)  What Durable Medical Equipment (DME) was ordered?: -- (na) (4/14/2025 12:21 PM)    Patient Teaching  Does the patient have access to their discharge instructions?: Yes (4/14/2025 12:21 PM)  What is the patient's perception of their health status since discharge?: Improving (4/14/2025 12:21 PM)  Is the patient/caregiver able to teach back the hierarchy of who to call/visit for symptoms/problems? PCP, Specialist, Home Health nurse, Urgent Care, ED, 911: Yes (4/14/2025 12:21 PM)

## 2025-04-15 NOTE — PROGRESS NOTES
Subjective   Patient ID: Chito Baxter is a 79 y.o. male who presents for Transition of care and hospital follow up, Admitted 4/11/25 and discharged 4/13/25  for planned procedure   -s/p left carotid endarterectomy with bovine patch angioplasty      Admitted 4/11/25 and discharged 4/13/25  for planned procedure   -s/p left carotid endarterectomy with bovine patch angioplasty  He has a scotoma in his left eye that is in his superior central visit. He thought it was just the PCF worsening    He has the stack removed yesterday 4/15/25   He was started on Flomax but does not think he will need to take it long   He is voiding well and his stream is average. He has noticed a couple specks of heme in his urine   He is due to restart Eliquis tomorrow 4/17/25  The atorvastatin was increased to 40 mg daily from 10 mg daily              HEALTH :  PSA 4/15, 4/19, 6/20 and ordered 2/25  Colon 3/12, 10/17 adenoma, 10/20 and Q 5  Ca cardiac score 5/21 was 339 in LAD and atelectasis noted only   EKG 2022, 1/23, 3/23, 11/23, 10/24 cardio  Spot protein 6/20, 6/21, 6/22, 11/23, 12/24   Hep C 1/18 -  Hep A 8/1/2023, 2/24   HD flu 9/20, 9/21, 9/22, 10/23, 10/24   TDAP 2006, 12/18, 9/28/2022  RSV completed 2023  Pneumovax 2011   Prevnar 13 12/14/17  Shingrix 5/18 and 8/18  SARS-CoV-2- 2/21, 3/21, 11/21, 3/22, 9/22, 6/23, 9/23, 7/24, 12/24   Ophth He saw Dr Turner 4/25. He had BRAO in the left eye 2025. He has borderline glaucoma. He had bilateral cataract extraction with IOL placement both eyes 1/2024 with Dr Mathews. No MD or DR   Copy of his Advanced Directives scanned in his chart 6/2022        Review of Systems  All systems negative except those listed in the HPI      Objective   Visit Vitals  /72 (BP Location: Left arm, Patient Position: Sitting, BP Cuff Size: Adult)   Pulse 71    Body mass index is 36.34 kg/m².      Physical Exam  Vitals reviewed. Exam conducted with a chaperone present (wife).   Constitutional:        Appearance: Normal appearance. He is obese.   HENT:      Head: Normocephalic.      Right Ear: Tympanic membrane, ear canal and external ear normal.      Left Ear: Tympanic membrane, ear canal and external ear normal.      Nose: Nose normal.      Mouth/Throat:      Pharynx: Oropharynx is clear.   Eyes:      Conjunctiva/sclera: Conjunctivae normal.   Cardiovascular:      Rate and Rhythm: Normal rate and regular rhythm.      Pulses: Normal pulses.      Heart sounds: Normal heart sounds.      Comments: right leg chronically larger than the left, + BLE edema   Pulmonary:      Effort: Pulmonary effort is normal.      Breath sounds: Normal breath sounds.   Abdominal:      General: Bowel sounds are normal.      Palpations: Abdomen is soft.   Musculoskeletal:         General: Normal range of motion.      Cervical back: Normal range of motion and neck supple.   Skin:     General: Skin is warm.      Comments: left carotid endarterectomy site is healing, no infection noted, + edema, no hematoma or erythema, no discharge, small rash from the adhesive    Neurological:      General: No focal deficit present.      Mental Status: He is alert and oriented to person, place, and time.   Psychiatric:         Mood and Affect: Mood normal.         Behavior: Behavior normal.         Thought Content: Thought content normal.         Judgment: Judgment normal.       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   He works at [employer] on an as needed basis   He is working 4-8 shifts a month with 6 hour shifts and he is happy with this.  They are building a new house     Admitted 4/11/25 and discharged 4/13/25  for planned  "procedure   -s/p left carotid endarterectomy with bovine patch angioplasty 4/11/25  He has a scotoma in his left eye that is in his superior central visit. He thought it was just the PCF worsening    He has the stack removed yesterday 4/15/25   He was started on Flomax but does not think he will need to take it long   He is voiding well and his stream is average. He has noticed a couple specks of heme in his urine    He is due to restart Eliquis tomorrow 4/17/25- he can wait another day to start Eliquis and make sure he is not seeing heme in his urine.   He has dates to restart lisinopril and HCTZ in 5/25   The atorvastatin was increased to 40 mg daily from 10 mg daily  -- He will continue to monitor his BP at home and call if readings are elevated. Explained we will increase his BP medications sooner if his BP elevate.     Admitted 4/11/25 and discharged 4/13/25  for planned procedure:: On exam: left carotid endarterectomy site is healing, no infection noted, + edema, no hematoma or erythema, no discharge, small rash from the adhesive, pulm exam- moving air well  4/25   -s/p left carotid endarterectomy with bovine patch angioplasty 4/11/25  Hold Lisinopril, HCTZ and Nibivolol until follow up with PCP ; Holding anticoagulation for Afib for 5 days post-operatively given bleeding risk after carotid endarterectomy       He has no issues with his hearing to report   He had his hearing tested in the past and found to have high frequency loss      His weight in office is 239 with BMI of 36.34. We spent 15 minutes discussing diet and weight loss. The struggle of weight loss persists    I would like to see his BMI below 30.   He has been shopping in the \"husky\" section since childhood   Recommend he look into a plant based/ whole foods diet   He has lost 8 pounds since last visit. Encouraged continued weight loss      HTN: BP Stable.    Continue lisinopril 40 mg BID, HCTZ 25 mg daily and amlodipine 5 mg BID.   Continue " Bystolic 10 mg BID.   Stress test in 1/11 was normal  PVD studies in 2/12 were normal.  ECG: 10/24 afib @ 73 nonspecific ST -T wave abnormalities   Recommend he monitor his sodium intake   Recommend he monitor his BP at home and call with elevated readings   He saw Dr Mckeon in 12/24      A-Fib: Pro BNP 1,899 on labs in 12/24. He is not taking Eliquis or Bystolic but has a schedule when to begin each medication    He is monitoring his EKG on the [employer] mobile.     Dx him with atrial fib on appt in 12/16. S/p unsuccessful DCCV 1-6-17.    ECHO was normal, cardioversion failed x 3.   Continue Eliquis 5 mg daily and Bystolic 10 mg BID.   Cardiology discontinued Sotalol   He could not tolerate bisoprolol    Continue monitoring BP at home and call with elevated readings   He saw Dr Mckeon in 12/24     Carotid stenosis:   CTA head/ neck 3/25 : normal branching pattern from the aortic arch. Scattered calcifications of the aortic arch and origin of the RIGHT subclavian artery and LEFT subclavian artery.   Carotid US 2/25 Moderate stenosis right side and severe (> 80%) stenosis left side   He saw Dr Mahmood in 3/25 and recommend L CEA      I spent 15 minutes face to face discussing his cardiac risks.   We discussed the patients cardiovascular risk. If needed, lifestyle modifications recommended including: behavioral therapies of nutrition choices, exercise and eliminate habits that are contributing to their cardiac risk. We agreed to a plan to decrease his cardiovascular risks. Discussed ASA. Reviewed Guidelines and approved recommendations made to patient.   The patient's 10 yr CV risk was estimated at : ACO score 4/6 IO 4/25       HLD:   I would like to see his HDL between 55- 60. Increase exercise   Continue atorvastatin 40 mg daily   Ca cardiac score 5/2021 was 339 in LAD and atelectasis noted only   Recommend he look into a plant based/ whole foods diet      Hypothyroid:    Continue levothyroxine 50 mcg daily.     DM:     Diabetic Composite Score 5/5 IO 4/25   Continue metformin 1000 mg BID and Farxiga 10 mg daily   Continue glipizide 2.5 mg BID.    He exercises daily and wears support stockings when he travels.   Spot protein was elevated on labs in 12/24   Recommend he monitor his feet nightly for skin breakdown and ulcerations   Recommend he look into a plant based/ whole foods diet      Renal insuff:   We will continue to monitor  RUQ US 11/2023 was normal   He saw Dr Roberts 12/23     Guerrero's esophagus:  Continue pantoprazole 40 mg daily    ERCP 1/2024 The major papilla showed evidence of a previous biliary sphincterotomy, which was noted to be stenosed. The common bile duct was cannulated using an extraction balloon with guidewire. Contrast was injected. Multiple sweeps were performed in the common bile duct using a balloon. Sludge and debris were removed, achieving complete clearance. Completion/extension biliary sphincterotomy was performed using a sphincterotome. Sweeping was again performed in the common bile duct. No resistance to balloon sweep at conclusion.    EGD 1/2024 Irregular Z-line 43 cm from the incisors Bx -No intestinal metaplasia, dysplasia or malignancy is identified    He saw Dr Roberts 12/23      Seasonal allergies:  Continue loratadine 10 mg prn use      BLE edema/ bilateral venous insufficiency >Rt: On exam: right leg chronically larger than the left, + BLE edema 4/25  Recommend he wear support stockings daily   Recommend he monitor his daily sodium intake   Recommend he elevate his legs for 45 minutes at night    Increase hydration with water      -S/p lap cholecystectomy and #15 Polish drain was placed in the Morison's pouch 1/25/2023, s/p ERCP and sphincterotomy  He saw Dr Roberts in 12/23     Renal cyst:  The kidney cyst takes up 20% of volume.   He saw Dr Roberts 12/23       Diarrhea:   He has semi-liquid soft stools and rectal leakage with exertion.   Recommend Metamucil daily.      Balance  issues:  He declines balance therapy.  Recommend he try Pa Chi, encouraged him to modify his activity levels (not going up ladders, etc.     Skin issues:  He has hyperkeratosis on the auricles of his ears and seeing dermatology  He uses creams and sometimes derm freezes the area    He saw Dr Lozano and had some laser to the face 12/19  He used 5FU cream as prescribed by dermatology in 2021  He saw derm and had Mohs of BCC on the side of his nose 5/2024      Vitamin D deficiency: Vitamin D 66 on labs in 12/2021   Continue OTC Vitamin D3 2000 units daily.      Uric Acid levels in 7.4 on labs in 12/24  Continue allopurinol 100 mg daily     PSA normal in 6/2020 and ordered 2/25  Colonoscopy in 10/2020 and Q 5.      Ophth:    He saw Dr Turner 4/25. He had BRAO in the left eye 2025. He has borderline glaucoma. He had bilateral cataract extraction with IOL placement both eyes 1/2024 with Dr Mathews. No MD or DR      His wife is his medical POA and then his daughters. He has a living will.   Copy of his Advanced Directives scanned in his chart 6/2022     Hep C 1/18 -  Hep A 8/1/2023, 2/24   HD flu 9/20, 9/21, 9/22, 10/23, 10/24   TDAP 2006, 12/18, 9/28/2022  RSV completed 2023  Pneumovax 2011   Prevnar 13 12/14/17  Shingrix 5/18 and 8/18  SARS-CoV-2- 2/21, 3/21, 11/21, 3/22, 9/22, 6/23, 9/23, 7/24, 12/24      Some elements in the chart were copied from Dr. Perez's last office visit with patient. Notes have been updated where appropriate, and reflect my current medical decision making from today.      RTC in 6/25 for MCR or sooner if needed   (MCR due 6/2025, last mcr 6/4/2024)     Scribe Attestation  By signing my name below, [email or web address] , Scribe   attest that this documentation has been prepared under the direction and in the presence of Chichi Perez MD.

## 2025-04-16 ENCOUNTER — OFFICE VISIT (OUTPATIENT)
Dept: PRIMARY CARE | Facility: CLINIC | Age: 80
End: 2025-04-16
Payer: MEDICARE

## 2025-04-16 VITALS
DIASTOLIC BLOOD PRESSURE: 72 MMHG | SYSTOLIC BLOOD PRESSURE: 120 MMHG | HEART RATE: 71 BPM | WEIGHT: 239 LBS | OXYGEN SATURATION: 98 % | HEIGHT: 68 IN | BODY MASS INDEX: 36.22 KG/M2

## 2025-04-16 DIAGNOSIS — I48.3 TYPICAL ATRIAL FLUTTER (MULTI): ICD-10-CM

## 2025-04-16 DIAGNOSIS — I65.22 CAROTID STENOSIS, LEFT: ICD-10-CM

## 2025-04-16 DIAGNOSIS — H34.232 BRANCH RETINAL ARTERY OCCLUSION OF LEFT EYE: Primary | ICD-10-CM

## 2025-04-16 DIAGNOSIS — E11.9 CONTROLLED TYPE 2 DIABETES MELLITUS WITHOUT COMPLICATION, WITHOUT LONG-TERM CURRENT USE OF INSULIN: ICD-10-CM

## 2025-04-16 DIAGNOSIS — E87.70 LOCALIZED EDEMA DUE TO FLUID OVERLOAD: ICD-10-CM

## 2025-04-16 DIAGNOSIS — T83.9XXD COMPLICATION OF FOLEY CATHETER, SUBSEQUENT ENCOUNTER: ICD-10-CM

## 2025-04-16 DIAGNOSIS — N18.32 STAGE 3B CHRONIC KIDNEY DISEASE (MULTI): ICD-10-CM

## 2025-04-16 DIAGNOSIS — I10 BENIGN ESSENTIAL HYPERTENSION: ICD-10-CM

## 2025-04-16 DIAGNOSIS — E03.9 HYPOTHYROIDISM, UNSPECIFIED TYPE: ICD-10-CM

## 2025-04-16 DIAGNOSIS — Z98.890 S/P CAROTID ENDARTERECTOMY: ICD-10-CM

## 2025-04-16 PROBLEM — H43.813 VITREOUS DEGENERATION, BILATERAL: Status: ACTIVE | Noted: 2025-03-06

## 2025-04-16 PROBLEM — T83.9XXA COMPLICATION OF FOLEY CATHETER: Status: ACTIVE | Noted: 2025-04-11

## 2025-04-16 PROBLEM — Z96.1 PSEUDOPHAKIA: Status: ACTIVE | Noted: 2025-03-06

## 2025-04-16 PROBLEM — R31.0 GROSS HEMATURIA: Status: ACTIVE | Noted: 2025-04-11

## 2025-04-16 PROBLEM — H53.10 SUBJECTIVE VISUAL DISTURBANCE: Status: ACTIVE | Noted: 2025-03-08

## 2025-04-16 PROCEDURE — 1123F ACP DISCUSS/DSCN MKR DOCD: CPT | Performed by: INTERNAL MEDICINE

## 2025-04-16 PROCEDURE — 1159F MED LIST DOCD IN RCRD: CPT | Performed by: INTERNAL MEDICINE

## 2025-04-16 PROCEDURE — 3074F SYST BP LT 130 MM HG: CPT | Performed by: INTERNAL MEDICINE

## 2025-04-16 PROCEDURE — 1160F RVW MEDS BY RX/DR IN RCRD: CPT | Performed by: INTERNAL MEDICINE

## 2025-04-16 PROCEDURE — 99496 TRANSJ CARE MGMT HIGH F2F 7D: CPT | Performed by: INTERNAL MEDICINE

## 2025-04-16 PROCEDURE — 1157F ADVNC CARE PLAN IN RCRD: CPT | Performed by: INTERNAL MEDICINE

## 2025-04-16 PROCEDURE — 3078F DIAST BP <80 MM HG: CPT | Performed by: INTERNAL MEDICINE

## 2025-04-16 RX ORDER — TAMSULOSIN HYDROCHLORIDE 0.4 MG/1
0.4 CAPSULE ORAL
COMMUNITY
Start: 2025-04-14

## 2025-04-16 RX ORDER — METOPROLOL TARTRATE 50 MG/1
50 TABLET ORAL 2 TIMES DAILY
COMMUNITY
Start: 2025-04-01

## 2025-04-16 RX ORDER — ATORVASTATIN CALCIUM 40 MG/1
40 TABLET, FILM COATED ORAL DAILY
COMMUNITY

## 2025-04-16 ASSESSMENT — PATIENT HEALTH QUESTIONNAIRE - PHQ9
SUM OF ALL RESPONSES TO PHQ9 QUESTIONS 1 AND 2: 0
2. FEELING DOWN, DEPRESSED OR HOPELESS: NOT AT ALL
1. LITTLE INTEREST OR PLEASURE IN DOING THINGS: NOT AT ALL

## 2025-04-16 NOTE — PROGRESS NOTES
"Patient: Chito Baxter  : 1945  PCP: Chichi Perez MD  MRN: [id number]  Program: Transitional Care Management  Status: Enrolled  Effective Dates: 2025 - present  Responsible Staff: Dang Blackmon MA  Social Drivers to be Addressed: Alcohol Use, Financial Resource Strain, Physical Activity, Social Connections, Stress, Transportation Needs         Chito Baxter is a 79 y.o. male presenting today for follow-up after being discharged from the hospital 4 days ago. The main problem requiring admission was LEFT CAROTID ENDORTORECTOMY AND left retinal artery occlusion . The discharge summary and/or Transitional Care Management documentation was reviewed. Medication reconciliation was performed as indicated via the \"Sonu as Reviewed\" timestamp.     Chito Bxater was contacted by Transitional Care Management services two days after his discharge. This encounter and supporting documentation was reviewed.    Review of Systems    /72 (BP Location: Left arm, Patient Position: Sitting, BP Cuff Size: Adult)   Pulse 71   Ht 1.727 m (5' 8\")   Wt 108 kg (239 lb)   SpO2 98%   BMI 36.34 kg/m²     Physical Exam    The complexity of medical decision making for this patient's transitional care is high.    Assessment/Plan   Problem List Items Addressed This Visit    None      "

## 2025-04-17 PROBLEM — I38 ENDOCARDITIS: Status: RESOLVED | Noted: 2023-05-25 | Resolved: 2025-04-17

## 2025-04-17 PROBLEM — N28.9 DISORDER OF KIDNEY: Status: RESOLVED | Noted: 2023-05-25 | Resolved: 2025-04-17

## 2025-04-22 ENCOUNTER — PATIENT OUTREACH (OUTPATIENT)
Dept: PRIMARY CARE | Facility: CLINIC | Age: 80
End: 2025-04-22
Payer: MEDICARE

## 2025-04-22 NOTE — PROGRESS NOTES
Confirmation of at least 2 patient identifiers.    Completed telephonic follow-up with patient after recent visit with PCP    Spoke to patient during outreach call.    Patient reports feeling: Improved    Patient has questions or concerns about medications: No    Have all prescribed medications been filled? Yes    Patient has necessary resources to manage their care? Yes    Patient has questions or concerns? 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   Care  information provided to patient.

## 2025-05-04 DIAGNOSIS — E79.0 ELEVATED URIC ACID IN BLOOD: ICD-10-CM

## 2025-05-04 DIAGNOSIS — E03.9 HYPOTHYROIDISM, UNSPECIFIED TYPE: ICD-10-CM

## 2025-05-04 DIAGNOSIS — E11.69 TYPE 2 DIABETES MELLITUS WITH OTHER SPECIFIED COMPLICATION, UNSPECIFIED WHETHER LONG TERM INSULIN USE (MULTI): ICD-10-CM

## 2025-05-05 DIAGNOSIS — E79.0 ELEVATED URIC ACID IN BLOOD: ICD-10-CM

## 2025-05-05 DIAGNOSIS — E11.9 CONTROLLED TYPE 2 DIABETES MELLITUS WITHOUT COMPLICATION, WITHOUT LONG-TERM CURRENT USE OF INSULIN: ICD-10-CM

## 2025-05-05 DIAGNOSIS — N18.32 STAGE 3B CHRONIC KIDNEY DISEASE (MULTI): ICD-10-CM

## 2025-05-05 DIAGNOSIS — E11.69 TYPE 2 DIABETES MELLITUS WITH OTHER SPECIFIED COMPLICATION, UNSPECIFIED WHETHER LONG TERM INSULIN USE (MULTI): ICD-10-CM

## 2025-05-05 DIAGNOSIS — E03.9 HYPOTHYROIDISM, UNSPECIFIED TYPE: ICD-10-CM

## 2025-05-05 RX ORDER — GLIPIZIDE 2.5 MG/1
2.5 TABLET ORAL DAILY
Qty: 90 TABLET | Refills: 3 | Status: SHIPPED | OUTPATIENT
Start: 2025-05-05 | End: 2026-05-05

## 2025-05-05 RX ORDER — METFORMIN HYDROCHLORIDE 1000 MG/1
1000 TABLET ORAL
Qty: 180 TABLET | Refills: 3 | Status: SHIPPED | OUTPATIENT
Start: 2025-05-05

## 2025-05-05 RX ORDER — ALLOPURINOL 100 MG/1
100 TABLET ORAL DAILY
Qty: 90 TABLET | Refills: 3 | Status: SHIPPED | OUTPATIENT
Start: 2025-05-05

## 2025-05-05 RX ORDER — DAPAGLIFLOZIN 10 MG/1
10 TABLET, FILM COATED ORAL DAILY
Qty: 90 TABLET | Refills: 3 | Status: SHIPPED | OUTPATIENT
Start: 2025-05-05 | End: 2026-05-05

## 2025-05-05 RX ORDER — LEVOTHYROXINE SODIUM 50 UG/1
50 TABLET ORAL DAILY
Qty: 90 TABLET | Refills: 3 | Status: SHIPPED | OUTPATIENT
Start: 2025-05-05 | End: 2026-05-05

## 2025-05-05 RX ORDER — ALLOPURINOL 100 MG/1
100 TABLET ORAL DAILY
Qty: 90 TABLET | Refills: 0 | Status: SHIPPED | OUTPATIENT
Start: 2025-05-05 | End: 2025-05-05 | Stop reason: SDUPTHER

## 2025-05-05 RX ORDER — METFORMIN HYDROCHLORIDE 1000 MG/1
1000 TABLET ORAL
Qty: 180 TABLET | Refills: 0 | Status: SHIPPED | OUTPATIENT
Start: 2025-05-05 | End: 2025-05-05 | Stop reason: SDUPTHER

## 2025-05-05 RX ORDER — LEVOTHYROXINE SODIUM 50 UG/1
TABLET ORAL
Qty: 90 TABLET | Refills: 0 | Status: SHIPPED | OUTPATIENT
Start: 2025-05-05 | End: 2025-05-05 | Stop reason: SDUPTHER

## 2025-06-09 NOTE — PROGRESS NOTES
Subjective   Reason for Visit: Chito Baxter is an 79 y.o. male here for his Subsequent Medicare Assessment, annual physical and follow up           They are building a house.    He will call to schedule an appt for the colonoscopy with Dr Canales's group     He saw Dr Turner 6/25. He has a new partial retinal tear superior temporal right eye.   He saw Dr Baird 6/25 and had laser repair of retinal tear right eye.         HEALTH :  PSA 4/15, 4/19, 6/20 and ordered 2/25  Colon 3/12, 10/17 adenoma, 10/20 and Q 5  Ca cardiac score 5/21 was 339 in LAD and atelectasis noted only   EKG 1/23, 3/23, 11/23, 10/24, 4/25 cardio  Spot protein 6/20, 6/21, 6/22, 11/23, 12/24   Hep C 1/18 -  Hep A 8/1/2023, 2/24   HD flu 9/20, 9/21, 9/22, 10/23, 10/24   TDAP 2006, 12/18, 9/28/22  RSV completed 2023  Pneumovax 2011   Prevnar 13 12/14/17  Shingrix 5/18 and 8/18  SARS-CoV-2- 2/21, 3/21, 11/21, 3/22, 9/22, 6/23, 9/23, 7/24, 12/24   Ophth He saw Dr Turner 6/25. He has a new partial retinal tear superior temporal right eye. He saw Dr Baird 6/25 and had laser repair of retinal tear right eye. He had BRAO in the left eye 2025. He has borderline glaucoma. He had bilateral cataract extraction with IOL placement both eyes 1/2024 with Dr Mathews. No MD or    Copy of his Advanced Directives scanned in his chart 6/2022       Patient Care Team:  Chichi Perez MD as PCP - General  Chichi Perez MD as PCP - [employer] PCP     Review of Systems  All systems negative except those listed in the HPI      Past Medical, Surgical, and Family History reviewed and updated in chart.  Reviewed all medications by prescribing practitioner or clinical pharmacist   (such as prescriptions, OTCs, herbal therapies and supplements) and documented in the medical record      Objective   Vitals:  Visit Vitals  /58 (BP Location: Left arm, Patient Position: Sitting, BP Cuff Size: Adult)   Pulse 74   Temp 36.4 °C (97.5 °F) (Temporal)     Body mass index is 34.97 kg/m².     Physical Exam  Vitals reviewed.   Constitutional:       Appearance: Normal appearance. He is obese.   HENT:      Head: Normocephalic.      Right Ear: Tympanic membrane, ear canal and external ear normal.      Left Ear: Tympanic membrane, ear canal and external ear normal.      Nose: Nose normal.      Mouth/Throat:      Pharynx: Oropharynx is clear.      Comments: PND noted   Eyes:      Conjunctiva/sclera: Conjunctivae normal.   Cardiovascular:      Rate and Rhythm: Normal rate.      Pulses: Normal pulses.      Heart sounds: Normal heart sounds.      Comments: a-fib with good rate control  BLE venous congestion, right leg chronically larger than the left   Pulmonary:      Effort: Pulmonary effort is normal.      Breath sounds: Normal breath sounds.   Abdominal:      General: Bowel sounds are normal.      Palpations: Abdomen is soft.   Musculoskeletal:         General: Normal range of motion.      Cervical back: Normal range of motion and neck supple.   Skin:     General: Skin is warm.   Neurological:      General: No focal deficit present.      Mental Status: He is alert and oriented to person, place, and time.      Comments: Diabetic foot exam normal    Psychiatric:         Mood and Affect: Mood normal.         Behavior: Behavior normal.         Thought Content: Thought content normal.         Judgment: Judgment normal.       Assessment & Plan       Problem List Items Addressed This Visit       Benign essential hypertension    Chronic combined systolic and diastolic heart failure    Relevant Medications    nebivolol (Bystolic) 5 mg tablet    Chronic gastric ulcer without hemorrhage and without perforation                Relevant Medications    pantoprazole (ProtoNix) 40 mg EC tablet    Controlled type 2 diabetes mellitus without complication, without long-term current use of insulin    Hyperlipidemia    Hypothyroidism    Mobitz type I incomplete atrioventricular block    Stage 3b  chronic kidney disease (Multi)     Other Visit Diagnoses         Routine general medical examination at health care facility    -  Primary    Relevant Orders    1 Year Follow Up In [facility] - PCP - Wellness Exam      Retinal tear, right               Subsequent Medicare Assessment, annual physical and follow up completed   Reviewed his labs from 4/25   Medication reconciliation performed with patient today in detail      Medicare Wellness completed  -  Discussed healthy diet and regular exercise.    -  Physical exam overall unremarkable. Immunizations reviewed and updated accordingly. Healthy lifestyle choices discussed (tobacco avoidance, appropriate alcohol use, avoidance of illicit substances).   -  Patient is wearing seatbelt.   -  Screening lab work ordered as indicated.    -  Age appropriate screening tests reviewed with patient.         We spent 5 minutes discussing depression screen and there is nothing found that is of concern for underling depression. The PQH form was filled and the meds reviewed.   No depression to report      We spent 5 minutes discussing alcohol use and there are no concerns about overuse. The 5 min was spent in going over any issues of use of alcohol.   He has 1 EtOH drink a day.      He has grab bars in the shower.  He has not fallen recently and no risk of falls in the house   He has good lighting around the house and functioning smoke detectors.    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   He works at [employer] on an as needed basis   He is working 4-8 shifts a month with 6 hour shifts and he is happy with this.  They are building a new house      He has no issues with his hearing to report   He had his hearing tested in the past and found to have high frequency loss      His weight in office is 230 with BMI of 34.97. We spent 15 minutes discussing diet and weight loss. The struggle of weight loss  "persists    I would like to see his BMI below 30.   He has been shopping in the \"[employer]\" section since childhood   Recommend he look into a plant based/ whole foods diet   He has continued intentional weight loss. Encouraged patient to continue      HTN: BP Stable.    Continue lisinopril 40 mg BID, HCTZ 25 mg daily and Bystolic 5 mg daily   Stress test in  was normal  PVD studies in  were normal.  EC/25 Afib @ 69 nonspecific ST-T wave abnormalities my incomplete RBBB   Recommend he monitor his sodium intake   Recommend he monitor his BP at home and call with elevated readings   He saw Dr Mckeon in       A-Fib: On exam: a-fib with good rate control   He is monitoring his EKG on the [employer]     Dx him with atrial fib on appt in . S/p unsuccessful DCCV 17.    ECHO was normal, cardioversion failed x 3.   Continue lisinopril 40 mg BID, HCTZ 25 mg daily and Bystolic 5 mg daily    Cardiology discontinued Sotalol   He could not tolerate bisoprolol    Continue monitoring BP at home and call with elevated readings   He saw Dr Mckeon in        Carotid stenosis:   CTA head/ neck 3/25 : normal branching pattern from the aortic arch. Scattered calcifications of the aortic arch and origin of the RIGHT subclavian artery and LEFT subclavian artery.   Carotid US  Moderate stenosis right side and severe (> 80%) stenosis left side   - -s/p left carotid endarterectomy with bovine patch angioplasty 25  He saw Dr Mahmood in  continue ASA and high intensity statin and follow again in 6 months      I spent 15 minutes face to face discussing his cardiac risks.   We discussed the patients cardiovascular risk. If needed, lifestyle modifications recommended including: behavioral therapies of nutrition choices, exercise and eliminate habits that are contributing to their cardiac risk. We agreed to a plan to decrease his cardiovascular risks. Discussed ASA. Reviewed Guidelines and approved " recommendations made to patient.   The patient's 10 yr CV risk was estimated at : ACO score 4/6 IO 6/25       HLD: Labs ordered and we will adjust if indicated  2/25  I would like to see his HDL between 55- 60. Increase exercise   Continue atorvastatin 40 mg daily   Ca cardiac score 5/2021 was 339 in LAD and atelectasis noted only   Recommend he look into a plant based/ whole foods diet      Hypothyroid: Labs ordered and we will adjust if indicated  2/25  Continue levothyroxine 50 mcg daily.     DM:  HbA1c 6.8 % on labs in 4/25  Diabetic Composite Score 5/5 IO 6/25   Continue metformin 1000 mg BID and Farxiga 10 mg daily   Continue glipizide 2.5 mg BID. Refilled glipizide 6/25.   He exercises daily and wears support stockings when he travels.   Spot protein was elevated on labs in 12/24   Recommend he monitor his feet nightly for skin breakdown and ulcerations   Recommend he look into a plant based/ whole foods diet      Renal insuff: Cr 1.62 on labs in 5/25  We will continue to monitor  RUQ US 11/2023 was normal   He saw Dr Roberts 12/23     Guerrero's esophagus:  Continue pantoprazole 40 mg daily    ERCP 1/2024 The major papilla showed evidence of a previous biliary sphincterotomy, which was noted to be stenosed. The common bile duct was cannulated using an extraction balloon with guidewire. Contrast was injected. Multiple sweeps were performed in the common bile duct using a balloon. Sludge and debris were removed, achieving complete clearance. Completion/extension biliary sphincterotomy was performed using a sphincterotome. Sweeping was again performed in the common bile duct. No resistance to balloon sweep at conclusion.    EGD 1/2024 Irregular Z-line 43 cm from the incisors Bx -No intestinal metaplasia, dysplasia or malignancy is identified    He saw Dr Roberts 12/23      Seasonal allergies:  Continue loratadine 10 mg prn use      BLE edema/ bilateral venous insufficiency >Rt: On exam: right leg chronically  larger than the left 6/25  Recommend he wear support stockings daily   Recommend he monitor his daily sodium intake   Recommend he elevate his legs for 45 minutes at night    Increase hydration with water      -S/p lap cholecystectomy and #15 Wolof drain was placed in the Morison's pouch 1/25/2023, s/p ERCP and sphincterotomy  He saw Dr Roberts in 12/23    Hematuria   He saw LESLI Regalado in 5/25 (urology) continue Flomax and RTC 3 weeks follow up with UA/PVR      Renal cyst:  The kidney cyst takes up 20% of volume.   He saw Dr Roberts 12/23       Diarrhea:   He has semi-liquid soft stools and rectal leakage with exertion.   Recommend Metamucil daily.      Balance issues:  He declines balance therapy.  Recommend he try Pa [facility], encouraged him to modify his activity levels (not going up ladders, etc.     Skin issues:  He has hyperkeratosis on the auricles of his ears and seeing dermatology  He uses creams and sometimes derm freezes the area    He saw Dr Lozano and had some laser to the face 12/19  He used 5FU cream as prescribed by dermatology in 2021  He saw derm and had Mohs of BCC on the side of his nose 5/2024      Vitamin D deficiency: Vitamin D 66 on labs in 12/2021   Continue OTC Vitamin D3 2000 units daily.      Uric Acid levels in 7.4 on labs in 12/24  Continue allopurinol 100 mg daily     PSA normal in 6/20 and ordered 2/25  Colonoscopy in 10/2020 and Q 5. He will call to schedule an appt for the colonoscopy with Dr Canales's group 6/25     Ophth:    He saw Dr Turner 6/25. He has a new partial retinal tear superior temporal right eye. He saw Dr Baird 6/25 and had laser repair of retinal tear right eye. He had BRAO in the left eye 2025. He has borderline glaucoma. He had bilateral cataract extraction with IOL placement both eyes 1/2024 with Dr Matehws. No MD or DR GAN spent 15 min with the patient discussing their wishes for end of life choices.   We discussed the need for a Living Will and that wishes  should be discussed with Family.   The DNR status was reviewed, and we discussed the options of this and, the DNR _CC options as well. We also went over how important it was to have these choices written down and clear for any surviving family so that their wishes are followed   The patient and I came to to following agreement :   His wife is his medical POA and then his daughters. He has a living will.   Copy of his Advanced Directives scanned in his chart 6/2022     Hep C 1/18 -  Hep A 8/1/2023, 2/24   HD flu 9/20, 9/21, 9/22, 10/23, 10/24   TDAP 2006, 12/18, 9/28/2022  RSV completed 2023  Pneumovax 2011   Prevnar 13 12/14/17  Shingrix 5/18 and 8/18  SARS-CoV-2- 2/21, 3/21, 11/21, 3/22, 9/22, 6/23, 9/23, 7/24, 12/24      Some elements in the chart were copied from Dr. Perez's last office visit with patient. Notes have been updated where appropriate, and reflect my current medical decision making from today.      RTC in 6 months for follow up or sooner if needed   (MCR due 6/26, lat mcr 6/10/25)     Scribe Attestation  By signing my name below, I, Viky Jovani , Scribe attest that this documentation has been prepared under the direction and in the presence of Chichi Perez MD.

## 2025-06-10 ENCOUNTER — APPOINTMENT (OUTPATIENT)
Dept: PRIMARY CARE | Facility: CLINIC | Age: 80
End: 2025-06-10
Payer: MEDICARE

## 2025-06-10 VITALS
HEIGHT: 68 IN | DIASTOLIC BLOOD PRESSURE: 58 MMHG | TEMPERATURE: 97.5 F | WEIGHT: 230 LBS | BODY MASS INDEX: 34.86 KG/M2 | HEART RATE: 74 BPM | SYSTOLIC BLOOD PRESSURE: 100 MMHG | OXYGEN SATURATION: 97 %

## 2025-06-10 DIAGNOSIS — E78.2 MIXED HYPERLIPIDEMIA: ICD-10-CM

## 2025-06-10 DIAGNOSIS — E03.9 HYPOTHYROIDISM, UNSPECIFIED TYPE: ICD-10-CM

## 2025-06-10 DIAGNOSIS — Z00.00 ROUTINE GENERAL MEDICAL EXAMINATION AT HEALTH CARE FACILITY: Primary | ICD-10-CM

## 2025-06-10 DIAGNOSIS — N18.32 STAGE 3B CHRONIC KIDNEY DISEASE (MULTI): ICD-10-CM

## 2025-06-10 DIAGNOSIS — I50.42 CHRONIC COMBINED SYSTOLIC AND DIASTOLIC HEART FAILURE: ICD-10-CM

## 2025-06-10 DIAGNOSIS — I44.1 MOBITZ TYPE I INCOMPLETE ATRIOVENTRICULAR BLOCK: ICD-10-CM

## 2025-06-10 DIAGNOSIS — I10 BENIGN ESSENTIAL HYPERTENSION: ICD-10-CM

## 2025-06-10 DIAGNOSIS — H33.311 RETINAL TEAR, RIGHT: ICD-10-CM

## 2025-06-10 DIAGNOSIS — E11.9 CONTROLLED TYPE 2 DIABETES MELLITUS WITHOUT COMPLICATION, WITHOUT LONG-TERM CURRENT USE OF INSULIN: ICD-10-CM

## 2025-06-10 DIAGNOSIS — K25.7 CHRONIC GASTRIC ULCER WITHOUT HEMORRHAGE AND WITHOUT PERFORATION: ICD-10-CM

## 2025-06-10 PROBLEM — R31.0 GROSS HEMATURIA: Status: RESOLVED | Noted: 2025-04-11 | Resolved: 2025-06-10

## 2025-06-10 PROBLEM — T83.9XXA COMPLICATION OF FOLEY CATHETER: Status: RESOLVED | Noted: 2025-04-11 | Resolved: 2025-06-10

## 2025-06-10 PROCEDURE — 1158F ADVNC CARE PLAN TLK DOCD: CPT | Performed by: INTERNAL MEDICINE

## 2025-06-10 PROCEDURE — 1160F RVW MEDS BY RX/DR IN RCRD: CPT | Performed by: INTERNAL MEDICINE

## 2025-06-10 PROCEDURE — 1159F MED LIST DOCD IN RCRD: CPT | Performed by: INTERNAL MEDICINE

## 2025-06-10 PROCEDURE — 3074F SYST BP LT 130 MM HG: CPT | Performed by: INTERNAL MEDICINE

## 2025-06-10 PROCEDURE — 99397 PER PM REEVAL EST PAT 65+ YR: CPT | Performed by: INTERNAL MEDICINE

## 2025-06-10 PROCEDURE — 3078F DIAST BP <80 MM HG: CPT | Performed by: INTERNAL MEDICINE

## 2025-06-10 PROCEDURE — 1170F FXNL STATUS ASSESSED: CPT | Performed by: INTERNAL MEDICINE

## 2025-06-10 PROCEDURE — 1036F TOBACCO NON-USER: CPT | Performed by: INTERNAL MEDICINE

## 2025-06-10 PROCEDURE — 99214 OFFICE O/P EST MOD 30 MIN: CPT | Performed by: INTERNAL MEDICINE

## 2025-06-10 PROCEDURE — G0439 PPPS, SUBSEQ VISIT: HCPCS | Performed by: INTERNAL MEDICINE

## 2025-06-10 RX ORDER — GLIPIZIDE 2.5 MG/1
2.5 TABLET ORAL 2 TIMES DAILY
Qty: 180 TABLET | Refills: 3 | Status: SHIPPED | OUTPATIENT
Start: 2025-06-10 | End: 2025-06-10

## 2025-06-10 RX ORDER — NEBIVOLOL 10 MG/1
5 TABLET ORAL DAILY
Qty: 45 TABLET | Refills: 3 | Status: SHIPPED | OUTPATIENT
Start: 2025-06-10 | End: 2025-06-10 | Stop reason: DRUGHIGH

## 2025-06-10 RX ORDER — NEBIVOLOL 5 MG/1
5 TABLET ORAL DAILY
Qty: 90 TABLET | Refills: 3 | Status: SHIPPED | OUTPATIENT
Start: 2025-06-10 | End: 2026-06-10

## 2025-06-10 RX ORDER — PANTOPRAZOLE SODIUM 40 MG/1
40 TABLET, DELAYED RELEASE ORAL
Qty: 90 TABLET | Refills: 3 | Status: SHIPPED | OUTPATIENT
Start: 2025-06-10

## 2025-06-10 RX ORDER — GLIPIZIDE 2.5 MG/1
2.5 TABLET ORAL 2 TIMES DAILY
Qty: 180 TABLET | Refills: 3 | Status: SHIPPED | OUTPATIENT
Start: 2025-06-10 | End: 2026-06-10

## 2025-06-10 RX ORDER — BLOOD SUGAR DIAGNOSTIC
1 STRIP MISCELLANEOUS 2 TIMES DAILY
COMMUNITY
Start: 2025-05-04

## 2025-06-10 ASSESSMENT — ENCOUNTER SYMPTOMS
LOSS OF SENSATION IN FEET: 0
DEPRESSION: 0
OCCASIONAL FEELINGS OF UNSTEADINESS: 0

## 2025-06-10 ASSESSMENT — ACTIVITIES OF DAILY LIVING (ADL)
MANAGING_FINANCES: INDEPENDENT
GROCERY_SHOPPING: INDEPENDENT
BATHING: INDEPENDENT
DRESSING: INDEPENDENT
TAKING_MEDICATION: INDEPENDENT
DOING_HOUSEWORK: INDEPENDENT

## 2025-06-10 NOTE — PATIENT INSTRUCTIONS
It was a pleasure to see you today.  I would like to remind you about importance of a healthy lifestyle in order to improve your well-being and live longer. Try to engage in physical activities for at least 150 minutes per week.  Eat about 10 servings of fruits and vegetables daily. My advice is 2 servings of fruits and 8 servings of vegetables. For vegetables choose at least half of them green and at least half of them fresh.  Please avoid sugar, salt, fried food and saturated fat.  Weight loss is advised. Target BMI: below 25. Please follow low carbohydrate diet and daily exercise routine for at least 30 minutes. Nutritional consultation is available, please let me know if you are interested. I will be happy to discuss details with you if interested.   Have a good day and stay well.      Obesity is a chronic, relapsing, progressive, treatable, multifactorial, neurobehavioral disease, where in an increase in body fat promotes adipose (fat) tissue dysfunction, as well as biomechanical stress on the body which can result in adverse metabolic, biomechanical, and psychosocial health consequences, in addition to reducing quality of life and lifespan.   Without treatment, obesity is likely to progress and worsen, further increase the patient's risk for numerous health conditions caused by excess adiposity and increasing the risk for premature death.     - Counseling provided on impact of diet, physical activity, stress & sleep in treatment of obesity.    - Counseled on reduced calorie, high protein, high fiber, whole foods diet.  Counseling on benefits of avoidance of frequent intake of processed foods and liquid calories.   - Counseled on behavioral modification strategies and tools to support weight loss.   - Reviewed pathophysiology of obesity in context of relationship to nutrition, energy balance and environmental factors that influence nutrition and energy balance outcomes.  - Counseled on various behavioral  strategies and self monitoring practices to enhance understanding and individual assessment of energy balance, how various changes in types of foods consumed and macronutrient distribution can impact appetite regulation and be used as a tool in treatment of obesity.       The ability to age comfortably depends on how you invest in your body.   Include physical activity in your daily routine. Physical activity increases blood flow to your whole body, including your brain. ...  Eat a healthy diet. A heart-healthy diet may benefit your brain.   Stay mentally active. Be social.   Treat cardiovascular disease.  No smoking, excessive EtOH intake or illicit drug use.

## 2025-06-24 DIAGNOSIS — E11.9 CONTROLLED TYPE 2 DIABETES MELLITUS WITHOUT COMPLICATION, WITHOUT LONG-TERM CURRENT USE OF INSULIN: ICD-10-CM

## 2025-06-24 RX ORDER — BLOOD SUGAR DIAGNOSTIC
1 STRIP MISCELLANEOUS 2 TIMES DAILY
Qty: 200 STRIP | Refills: 0 | Status: SHIPPED | OUTPATIENT
Start: 2025-06-24

## 2025-06-24 RX ORDER — LANCETS
EACH MISCELLANEOUS
Qty: 200 EACH | Refills: 3 | Status: SHIPPED | OUTPATIENT
Start: 2025-06-24

## 2025-09-23 ENCOUNTER — APPOINTMENT (OUTPATIENT)
Facility: CLINIC | Age: 80
End: 2025-09-23
Payer: MEDICARE

## 2025-11-04 ENCOUNTER — APPOINTMENT (OUTPATIENT)
Facility: CLINIC | Age: 80
End: 2025-11-04
Payer: MEDICARE

## 2025-12-15 ENCOUNTER — APPOINTMENT (OUTPATIENT)
Dept: PRIMARY CARE | Facility: CLINIC | Age: 80
End: 2025-12-15
Payer: MEDICARE